# Patient Record
Sex: FEMALE | Race: WHITE | NOT HISPANIC OR LATINO | Employment: FULL TIME | URBAN - METROPOLITAN AREA
[De-identification: names, ages, dates, MRNs, and addresses within clinical notes are randomized per-mention and may not be internally consistent; named-entity substitution may affect disease eponyms.]

---

## 2022-12-14 PROBLEM — Z86.0100 HISTORY OF COLONIC POLYPS: Status: ACTIVE | Noted: 2022-12-14

## 2023-12-11 ENCOUNTER — TREATMENT (OUTPATIENT)
Dept: PHYSICAL THERAPY | Facility: CLINIC | Age: 53
End: 2023-12-11
Payer: COMMERCIAL

## 2023-12-11 DIAGNOSIS — S52.502D CLOSED FRACTURE OF DISTAL END OF LEFT RADIUS WITH ROUTINE HEALING, UNSPECIFIED FRACTURE MORPHOLOGY, SUBSEQUENT ENCOUNTER: Primary | ICD-10-CM

## 2023-12-11 DIAGNOSIS — S52.612A TRAUMATIC CLOSED FRACTURE OF ULNAR STYLOID WITH MINIMAL DISPLACEMENT, LEFT, INITIAL ENCOUNTER: ICD-10-CM

## 2023-12-11 DIAGNOSIS — M25.632 STIFFNESS OF LEFT WRIST JOINT: ICD-10-CM

## 2023-12-11 DIAGNOSIS — M25.532 LEFT WRIST PAIN: ICD-10-CM

## 2023-12-11 NOTE — PROGRESS NOTES
Occupational Therapy Daily Treatment Note  [address] [place] [address]      Patient: Danelle Rdz   : 1970  Referring practitioner: Shantanu Kelly*  Date of Initial Visit: Type: THERAPY  Noted: 2023  Today's Date: 2023  Patient seen for 8 sessions         Subjective   Danelle Rdz reports: my wrist did bother me last week when I pulled on a shopping cart that was stuck, but it is better now.    Objective   See Exercise, Manual, and Modality Logs for complete treatment.     Pt tolerated treatment well for strengthening and stretching.  Assessment/Plan    Visit Diagnoses:    ICD-10-CM ICD-9-CM   1. Closed fracture of distal end of left radius with routine healing, unspecified fracture morphology, subsequent encounter  S52.502D V54.12   2. Traumatic closed fracture of ulnar styloid with minimal displacement, left, initial encounter  S52.612A 813.43   3. Left wrist pain  M25.532 719.43   4. Stiffness of left wrist joint  M25.632 719.53       Continue per POC advancing as tolerated.         Timed:  Manual Therapy:    0     mins  76293;  Therapeutic Exercise:    10     mins  83476;     Therapeutic Activity:    10     mins  37849;  Ultrasound:     0     mins  84427;    Electrical Stimulation:    0     mins 45435;  Neuromuscular Jossue:    10    mins  18530;      Timed Treatment:   30   mins   Total Treatment:     30   min    MAXI Valerio/L  Occupational Therapist    Electronically signed   License number [id number]

## 2023-12-14 ENCOUNTER — OFFICE VISIT (OUTPATIENT)
Dept: ORTHOPEDIC SURGERY | Facility: CLINIC | Age: 53
End: 2023-12-14
Payer: COMMERCIAL

## 2023-12-14 ENCOUNTER — TREATMENT (OUTPATIENT)
Dept: PHYSICAL THERAPY | Facility: CLINIC | Age: 53
End: 2023-12-14
Payer: COMMERCIAL

## 2023-12-14 VITALS
OXYGEN SATURATION: 97 % | DIASTOLIC BLOOD PRESSURE: 81 MMHG | HEIGHT: 66 IN | SYSTOLIC BLOOD PRESSURE: 114 MMHG | BODY MASS INDEX: 25.49 KG/M2 | WEIGHT: 158.6 LBS | HEART RATE: 71 BPM

## 2023-12-14 DIAGNOSIS — M25.632 STIFFNESS OF LEFT WRIST JOINT: ICD-10-CM

## 2023-12-14 DIAGNOSIS — M25.532 LEFT WRIST PAIN: ICD-10-CM

## 2023-12-14 DIAGNOSIS — S52.612A TRAUMATIC CLOSED FRACTURE OF ULNAR STYLOID WITH MINIMAL DISPLACEMENT, LEFT, INITIAL ENCOUNTER: ICD-10-CM

## 2023-12-14 DIAGNOSIS — S52.502D CLOSED FRACTURE OF DISTAL END OF LEFT RADIUS WITH ROUTINE HEALING, UNSPECIFIED FRACTURE MORPHOLOGY, SUBSEQUENT ENCOUNTER: Primary | ICD-10-CM

## 2023-12-14 DIAGNOSIS — S52.615D CLOSED NONDISPLACED FRACTURE OF STYLOID PROCESS OF LEFT ULNA WITH ROUTINE HEALING, SUBSEQUENT ENCOUNTER: ICD-10-CM

## 2023-12-14 NOTE — PROGRESS NOTES
Occupational Therapy Daily Treatment Note  [address] [place] [id number]      Patient: Danelle Carpenter   : 1970  Referring practitioner: Shantanu Kelly*  Date of Initial Visit: Type: THERAPY  Noted: 2023  Today's Date: 2023  Patient seen for 9 sessions         Subjective   Danelle Carpenter reports: I saw the doctor yesterday and he released me so I think I am going to make today my last day. I did my work out today and no problem with weights. A little discomfort with some wall push ups.    Objective          Neurological Testing     Sensation     Wrist/Hand   Left   Intact: light touch    Active Range of Motion     Left Elbow   Forearm supination: 75 degrees   Forearm pronation: 75 degrees with pain    Left Wrist   Wrist flexion: 55 degrees   Wrist extension: 70 degrees   Radial deviation: 10 degrees   Ulnar deviation: 30 degrees     Right Wrist   Wrist flexion: 85 degrees   Wrist extension: 70 degrees     Additional Active Range of Motion Details  Full composite fist and thumb opposition    Strength/Myotome Testing     Left Wrist/Hand      (2nd hand position)     Trial 1: 29 lbs    Trial 2: 25 lbs    Trial 3: 27 lbs    Average: 27 lbs    Right Wrist/Hand      (2nd hand position)     Trial 1: 53 lbs    Trial 2: 50 lbs    Trial 3: 50 lbs    Average: 51 lbs    Swelling     Left Wrist/Hand   Circumference wrist: 16.2 cm    Right Wrist/Hand   Circumference wrist: 15.3 cm      See Exercise, Manual, and Modality Logs for complete treatment.       Assessment/Plan    Visit Diagnoses:    ICD-10-CM ICD-9-CM   1. Closed fracture of distal end of left radius with routine healing, unspecified fracture morphology, subsequent encounter  S52.502D V54.12   2. Traumatic closed fracture of ulnar styloid with minimal displacement, left, initial encounter  S52.612A 813.43   3. Left wrist pain  M25.532 719.43   4. Stiffness of left wrist joint  M25.632 719.53       Pt tolerated treatment  well and will continued strengthening and stretching at home. Discharge from Occupational Therapy.         Timed:  Manual Therapy:    0     mins  85709;  Therapeutic Exercise:    10     mins  66580;     Therapeutic Activity:    10     mins  04672;  Ultrasound:     00     mins  00109;    Electrical Stimulation:    0     mins 25812;  Neuromuscular Jossue:    10    mins  06447;      Timed Treatment:   30   mins   Total Treatment:     30   min    Annita Jackson OTR/L  Occupational Therapist    Electronically signed   License number [id number]

## 2023-12-14 NOTE — PROGRESS NOTES
"Chief Complaint  Follow-up of the Left Wrist    Subjective          History of Present Illness      Danelle Carpenter is a 53 y.o. female  presents to [facility] for     Patient presents for follow-up evaluation of left distal radius and ulnar styloid fractures, original injury occurred 9/26/2023.  At last visit her cast was removed and she was started on in the brace and with occupational therapy.  She states her wrist is \"a lot better \"with physical therapy she has regained most range of motion and strength.  She denies needing her wrist brace any longer send she denies needing pain medication or NSAIDs, she is happy with her progress, no new complaints.  She has a few more visits of therapy and she will then do home exercise program.      No Known Allergies     Social History     Socioeconomic History    Marital status:    Tobacco Use    Smoking status: Never     Passive exposure: Never    Smokeless tobacco: Never   Vaping Use    Vaping Use: Never used   Substance and Sexual Activity    Alcohol use: Never    Drug use: Never    Sexual activity: Yes     Birth control/protection: Surgical, Hysterectomy        REVIEW OF SYSTEMS    Constitutional: Awake alert and oriented x3, no acute distress, denies fevers, chills, weight loss  Respiratory: No respiratory distress  Vascular: Brisk cap refill, Intact distal pulses, No cyanosis, compartments soft with no signs or symptoms of compartment syndrome or DVT.   Cardiovascular: Denies chest pain, shortness of breath  Skin: Denies rashes, acute skin changes  Neurologic: Denies headache, loss of consciousness  MSK: Left wrist pain      Objective   Vital Signs:   /81   Pulse 71   Ht 167.6 cm (66\")   Wt 71.9 kg (158 lb 9.6 oz)   SpO2 97%   BMI 25.60 kg/m²     Body mass index is 25.6 kg/m².    Physical Exam       Left wrist: Nontender to palpation of dorsum of the wrist, volar wrist ulnar or radial side of the wrist, patient has " full extension full flexion with demonstration of prayer stretch and reverse prayer stretch, no pain with the stretches, she is able to make a full fist, sensation intact to light touch, neurovascular intact      Procedures    Imaging Results (Most Recent)       Procedure Component Value Units Date/Time    XR Wrist 2 View Left [[id number]] Resulted: 12/14/23 1036     Updated: 12/14/23 1036    Narrative:      View:AP/Lateral view(s)  Site: Left wrist  Indication: Wrist pain  Study: X-rays ordered, taken in the office, and reviewed today  X-ray: Good healing distal radius and ulna styloid fracture fracture   alignment remains stable compared to previous study  Comparative data: Previous studies             Result Review :   The following data was reviewed by: SHARON Villegas on 12/14/2023:  Data reviewed : Radiologic studies reviewed by me with the patient              Assessment and Plan    Diagnoses and all orders for this visit:    1. Closed fracture of distal end of left radius with routine healing, unspecified fracture morphology, subsequent encounter (Primary)    2. Closed nondisplaced fracture of styloid process of left ulna with routine healing, subsequent encounter    3. Left wrist pain  -     XR Wrist 2 View Left        Reviewed x-rays with the patient discussed diagnosis and treatment options with her she was advised to continue home exercises when finished with therapy, activity as tolerated follow-up as needed, patient agreed    Call or return if worsening symptoms.    Follow Up   Return if symptoms worsen or fail to improve.  Patient was given instructions and counseling regarding her condition or for health maintenance advice. Please see specific information pulled into the AVS if appropriate.       EMR Dragon/Transcription disclaimer:  Much of this encounter note is an electronic transcription/translation of spoken language to printed text, aka voice recognition.  The electronic translation  of spoken language may permit erroneous or at times nonsensical words or phrases to be inadvertently transcribed; although I have reviewed the note for such errors, some may still exist so please interpret based on surrounding text content.

## 2023-12-15 DIAGNOSIS — N95.2 VAGINAL ATROPHY: ICD-10-CM

## 2023-12-15 DIAGNOSIS — Z01.419 WELL WOMAN EXAM WITH ROUTINE GYNECOLOGICAL EXAM: ICD-10-CM

## 2023-12-15 RX ORDER — ESTRADIOL 0.1 MG/G
CREAM VAGINAL
Qty: 42.5 G | Refills: 2 | Status: CANCELLED | OUTPATIENT
Start: 2023-12-15

## 2023-12-15 NOTE — TELEPHONE ENCOUNTER
Caller: Danelle Carpenter    Relationship: Self    Best call back number: [phone]    Requested Prescriptions:   estradiol (ESTRACE VAGINAL) 0.1 MG/GM vaginal cream        Pharmacy where request should be sent:    [facility] [id number] - [place] - [address] AT [facility] [address] - [phone] [facility] [phone]  [phone]   Last office visit with prescribing clinician: 11/2/2023   Last telemedicine visit with prescribing clinician: Visit date not found   Next office visit with prescribing clinician: Visit date not found     Additional details provided by patient: PATIENT WOULD LIKE TO SWITCH BACK TO estradiol (ESTRACE VAGINAL) 0.1 MG/GM vaginal cream AND NOT DO THE HORMONE PATCHES.    Does the patient have less than a 3 day supply:  [x] Yes  [] No    Would you like a call back once the refill request has been completed: [x] Yes [] No    If the office needs to give you a call back, can they leave a voicemail: [x] Yes [] No    William Kaur Rep   12/15/23 11:16 EST

## 2023-12-19 RX ORDER — ESTRADIOL 0.1 MG/G
2 CREAM VAGINAL 2 TIMES WEEKLY
Qty: 42.5 G | Refills: 6 | Status: SHIPPED | OUTPATIENT
Start: 2023-12-21

## 2023-12-20 ENCOUNTER — LAB (OUTPATIENT)
Dept: LAB | Facility: HOSPITAL | Age: 53
End: 2023-12-20
Payer: COMMERCIAL

## 2023-12-20 DIAGNOSIS — E06.3 HYPOTHYROIDISM DUE TO HASHIMOTO'S THYROIDITIS: ICD-10-CM

## 2023-12-20 DIAGNOSIS — I34.1 MITRAL VALVE PROLAPSE: ICD-10-CM

## 2023-12-20 DIAGNOSIS — E03.8 HYPOTHYROIDISM DUE TO HASHIMOTO'S THYROIDITIS: ICD-10-CM

## 2023-12-20 DIAGNOSIS — E78.2 MIXED HYPERLIPIDEMIA: ICD-10-CM

## 2023-12-20 DIAGNOSIS — R53.83 FATIGUE, UNSPECIFIED TYPE: ICD-10-CM

## 2023-12-20 DIAGNOSIS — Z00.00 ANNUAL PHYSICAL EXAM: ICD-10-CM

## 2023-12-20 DIAGNOSIS — K21.9 GERD WITHOUT ESOPHAGITIS: ICD-10-CM

## 2023-12-20 LAB
ALBUMIN SERPL-MCNC: 4.9 G/DL (ref 3.5–5.2)
ALBUMIN/GLOB SERPL: 2 G/DL
ALP SERPL-CCNC: 84 U/L (ref 39–117)
ALT SERPL W P-5'-P-CCNC: 21 U/L (ref 1–33)
ANION GAP SERPL CALCULATED.3IONS-SCNC: 11.3 MMOL/L (ref 5–15)
AST SERPL-CCNC: 17 U/L (ref 1–32)
BASOPHILS # BLD AUTO: 0.07 10*3/MM3 (ref 0–0.2)
BASOPHILS NFR BLD AUTO: 1 % (ref 0–1.5)
BILIRUB SERPL-MCNC: 0.4 MG/DL (ref 0–1.2)
BUN SERPL-MCNC: 13 MG/DL (ref 6–20)
BUN/CREAT SERPL: 18.8 (ref 7–25)
CALCIUM SPEC-SCNC: 9.7 MG/DL (ref 8.6–10.5)
CHLORIDE SERPL-SCNC: 103 MMOL/L (ref 98–107)
CO2 SERPL-SCNC: 24.7 MMOL/L (ref 22–29)
CREAT SERPL-MCNC: 0.69 MG/DL (ref 0.57–1)
DEPRECATED RDW RBC AUTO: 41.6 FL (ref 37–54)
EGFRCR SERPLBLD CKD-EPI 2021: 102.6 ML/MIN/1.73
EOSINOPHIL # BLD AUTO: 0.19 10*3/MM3 (ref 0–0.4)
EOSINOPHIL NFR BLD AUTO: 2.7 % (ref 0.3–6.2)
ERYTHROCYTE [DISTWIDTH] IN BLOOD BY AUTOMATED COUNT: 12.6 % (ref 12.3–15.4)
GLOBULIN UR ELPH-MCNC: 2.5 GM/DL
GLUCOSE SERPL-MCNC: 93 MG/DL (ref 65–99)
HCT VFR BLD AUTO: 42.8 % (ref 34–46.6)
HGB BLD-MCNC: 14.6 G/DL (ref 12–15.9)
IMM GRANULOCYTES # BLD AUTO: 0.04 10*3/MM3 (ref 0–0.05)
IMM GRANULOCYTES NFR BLD AUTO: 0.6 % (ref 0–0.5)
LYMPHOCYTES # BLD AUTO: 2.83 10*3/MM3 (ref 0.7–3.1)
LYMPHOCYTES NFR BLD AUTO: 40.7 % (ref 19.6–45.3)
MCH RBC QN AUTO: 30.4 PG (ref 26.6–33)
MCHC RBC AUTO-ENTMCNC: 34.1 G/DL (ref 31.5–35.7)
MCV RBC AUTO: 89.2 FL (ref 79–97)
MONOCYTES # BLD AUTO: 0.48 10*3/MM3 (ref 0.1–0.9)
MONOCYTES NFR BLD AUTO: 6.9 % (ref 5–12)
NEUTROPHILS NFR BLD AUTO: 3.35 10*3/MM3 (ref 1.7–7)
NEUTROPHILS NFR BLD AUTO: 48.1 % (ref 42.7–76)
NRBC BLD AUTO-RTO: 0 /100 WBC (ref 0–0.2)
PLATELET # BLD AUTO: 280 10*3/MM3 (ref 140–450)
PMV BLD AUTO: 9.8 FL (ref 6–12)
POTASSIUM SERPL-SCNC: 4.4 MMOL/L (ref 3.5–5.2)
PROT SERPL-MCNC: 7.4 G/DL (ref 6–8.5)
RBC # BLD AUTO: 4.8 10*6/MM3 (ref 3.77–5.28)
SODIUM SERPL-SCNC: 139 MMOL/L (ref 136–145)
T4 FREE SERPL-MCNC: 1.48 NG/DL (ref 0.93–1.7)
TSH SERPL DL<=0.05 MIU/L-ACNC: 0.7 UIU/ML (ref 0.27–4.2)
WBC NRBC COR # BLD AUTO: 6.96 10*3/MM3 (ref 3.4–10.8)

## 2023-12-20 PROCEDURE — 80050 GENERAL HEALTH PANEL: CPT

## 2023-12-20 PROCEDURE — 36415 COLL VENOUS BLD VENIPUNCTURE: CPT

## 2023-12-20 PROCEDURE — 84439 ASSAY OF FREE THYROXINE: CPT

## 2023-12-28 ENCOUNTER — OFFICE VISIT (OUTPATIENT)
Dept: INTERNAL MEDICINE | Facility: CLINIC | Age: 53
End: 2023-12-28
Payer: COMMERCIAL

## 2023-12-28 VITALS
SYSTOLIC BLOOD PRESSURE: 117 MMHG | HEIGHT: 66 IN | TEMPERATURE: 98 F | WEIGHT: 158 LBS | HEART RATE: 75 BPM | DIASTOLIC BLOOD PRESSURE: 79 MMHG | BODY MASS INDEX: 25.39 KG/M2 | OXYGEN SATURATION: 95 %

## 2023-12-28 DIAGNOSIS — E78.2 MIXED HYPERLIPIDEMIA: ICD-10-CM

## 2023-12-28 DIAGNOSIS — E03.8 HYPOTHYROIDISM DUE TO HASHIMOTO'S THYROIDITIS: Primary | ICD-10-CM

## 2023-12-28 DIAGNOSIS — E06.3 HYPOTHYROIDISM DUE TO HASHIMOTO'S THYROIDITIS: Primary | ICD-10-CM

## 2023-12-28 DIAGNOSIS — K21.9 GERD WITHOUT ESOPHAGITIS: ICD-10-CM

## 2023-12-28 DIAGNOSIS — I34.1 MITRAL VALVE PROLAPSE: ICD-10-CM

## 2023-12-28 PROCEDURE — 99214 OFFICE O/P EST MOD 30 MIN: CPT | Performed by: INTERNAL MEDICINE

## 2023-12-28 RX ORDER — LEVOTHYROXINE SODIUM 112 UG/1
112 TABLET ORAL DAILY
Qty: 90 TABLET | Refills: 3 | Status: SHIPPED | OUTPATIENT
Start: 2023-12-28

## 2023-12-28 NOTE — PROGRESS NOTES
"CHIEF COMPLAINT/ HPI: Patient is here to follow-up with recent lab work, thyroid issues,  Hyperlipidemia (Routine follow up, Lab follow up up. Concern of weight gain. )    Patient fell broke her left wrist, was in a cast September 26, 2023, doing better overall now          Objective   Vital Signs  Vitals:    12/28/23 0823   BP: 117/79   Pulse: 75   Temp: 98 °F (36.7 °C)   SpO2: 95%   Weight: 71.7 kg (158 lb)   Height: 167.6 cm (65.98\")      Body mass index is 25.51 kg/m².  Review of Systems   Constitutional: Negative.    HENT: Negative.     Eyes: Negative.    Respiratory: Negative.     Cardiovascular: Negative.    Gastrointestinal: Negative.    Endocrine: Negative.    Genitourinary: Negative.    Musculoskeletal: Negative.    Allergic/Immunologic: Negative.    Neurological: Negative.    Hematological: Negative.    Psychiatric/Behavioral: Negative.        Physical Exam  Constitutional:       General: She is not in acute distress.     Appearance: Normal appearance.   HENT:      Head: Normocephalic.      Mouth/Throat:      Mouth: Mucous membranes are moist.   Eyes:      Conjunctiva/sclera: Conjunctivae normal.      Pupils: Pupils are equal, round, and reactive to light.   Cardiovascular:      Rate and Rhythm: Normal rate and regular rhythm.      Pulses: Normal pulses.      Heart sounds: Normal heart sounds.   Pulmonary:      Effort: Pulmonary effort is normal.      Breath sounds: Normal breath sounds.   Abdominal:      General: Abdomen is flat. Bowel sounds are normal.      Palpations: Abdomen is soft.   Musculoskeletal:         General: No swelling. Normal range of motion.      Cervical back: Neck supple.   Skin:     General: Skin is warm and dry.      Coloration: Skin is not jaundiced.   Neurological:      General: No focal deficit present.      Mental Status: She is alert and oriented to person, place, and time. Mental status is at baseline.   Psychiatric:         Mood and Affect: Mood normal.         Behavior: " "Behavior normal.         Thought Content: Thought content normal.         Judgment: Judgment normal.        Result Review :   No results found for: \"PROBNP\", \"BNP\"  CMP          6/20/2023    09:47 12/20/2023    09:37   CMP   Glucose 99  93    BUN 12  13    Creatinine 0.67  0.69    EGFR 103.4  102.6    Sodium 140  139    Potassium 4.9  4.4    Chloride 102  103    Calcium 10.0  9.7    Total Protein 7.7  7.4    Albumin 5.1  4.9    Globulin 2.6  2.5    Total Bilirubin 0.5  0.4    Alkaline Phosphatase 73  84    AST (SGOT) 17  17    ALT (SGPT) 16  21    Albumin/Globulin Ratio 2.0  2.0    BUN/Creatinine Ratio 17.9  18.8    Anion Gap 10.0  11.3      CBC w/diff          6/20/2023    09:47 12/20/2023    09:37   CBC w/Diff   WBC 6.62  6.96    RBC 5.05  4.80    Hemoglobin 14.9  14.6    Hematocrit 44.3  42.8    MCV 87.7  89.2    MCH 29.5  30.4    MCHC 33.6  34.1    RDW 12.4  12.6    Platelets 274  280    Neutrophil Rel % 49.7  48.1    Immature Granulocyte Rel % 0.3  0.6    Lymphocyte Rel % 38.8  40.7    Monocyte Rel % 8.0  6.9    Eosinophil Rel % 2.6  2.7    Basophil Rel % 0.6  1.0       Lipid Panel          6/20/2023    09:47   Lipid Panel   Total Cholesterol 192    Triglycerides 189    HDL Cholesterol 65    VLDL Cholesterol 32    LDL Cholesterol  95    LDL/HDL Ratio 1.37       Lab Results   Component Value Date    TSH 0.703 12/20/2023    TSH 0.032 (L) 06/20/2023    TSH 0.060 (L) 06/15/2022      Lab Results   Component Value Date    FREET4 1.48 12/20/2023    FREET4 1.82 (H) 06/20/2023    FREET4 1.58 06/15/2022                          Visit Diagnoses:    ICD-10-CM ICD-9-CM   1. Hypothyroidism due to Hashimoto's thyroiditis  E03.8 244.8    E06.3 245.2   2. GERD without esophagitis  K21.9 530.81   3. Mixed hyperlipidemia  E78.2 272.2   4. Mitral valve prolapse  I34.1 424.0       Assessment and Plan   Diagnoses and all orders for this visit:    1. Hypothyroidism due to Hashimoto's thyroiditis (Primary)  -     CBC & Differential; " Future  -     Comprehensive Metabolic Panel; Future  -     Lipid Panel; Future  -     TSH+Free T4; Future    2. GERD without esophagitis  -     CBC & Differential; Future  -     Comprehensive Metabolic Panel; Future  -     Lipid Panel; Future  -     TSH+Free T4; Future    3. Mixed hyperlipidemia  -     CBC & Differential; Future  -     Comprehensive Metabolic Panel; Future  -     Lipid Panel; Future  -     TSH+Free T4; Future    4. Mitral valve prolapse  -     CBC & Differential; Future  -     Comprehensive Metabolic Panel; Future  -     Lipid Panel; Future  -     TSH+Free T4; Future    Other orders  -     levothyroxine (Synthroid) 112 MCG tablet; Take 1 tablet by mouth Daily.  Dispense: 90 tablet; Refill: 3      Wrist fracture September 2023 after a fall, doing well,  Bone density test showed normal results June 5, 2023     Colonoscopy for colon polyps February 2018 ---3 polyps mild gastritis Dr. Ring -------March 2023,, found several polyps, did upper and lower, has a follow-up again in 3 years, Dr. Vargas    History of odontophagia--- fullness esophagus, previous esophagram showed mid esophageal fullness, CT of the chest July 2019 showed no significant thoracic esophageal abnormality,  no lung abnormality,    Hypothyroidism continues  levothyroxine 0.112 mg daily    Mixed hyperlipidemia continues  Crestor 5 mg daily     Mitral valve prolapse, palpitations, continues  propranolol 10 mg every other day --- echo 2018 showed mild mitral valve prolapse,, repeat echo June 5, 2023 --shows normal LV function, diastolic dysfunction was normal no significant valve abnormalities    Thyroid nodules previous--- ultrasound July 2015 no nodules at that time    Anxiety continues fluoxetine 10 mg every 3 days,      Follow Up   Return in about 6 months (around 6/28/2024).  Patient was given instructions and counseling regarding her condition or for health maintenance advice. Please see specific information pulled into the  AVS if appropriate.         Answers submitted by the patient for this visit:  Primary Reason for Visit (Submitted on 12/21/2023)  What is the primary reason for your visit?: Physical

## 2024-02-13 ENCOUNTER — HOSPITAL ENCOUNTER (OUTPATIENT)
Dept: MAMMOGRAPHY | Facility: HOSPITAL | Age: 54
Discharge: HOME | End: 2024-02-13
Admitting: OBSTETRICS & GYNECOLOGY
Payer: COMMERCIAL

## 2024-02-13 DIAGNOSIS — Z01.419 WELL WOMAN EXAM WITH ROUTINE GYNECOLOGICAL EXAM: ICD-10-CM

## 2024-02-13 PROCEDURE — 77063 BREAST TOMOSYNTHESIS BI: CPT

## 2024-02-13 PROCEDURE — 77067 SCR MAMMO BI INCL CAD: CPT

## 2024-04-04 ENCOUNTER — OFFICE VISIT (OUTPATIENT)
Dept: INTERNAL MEDICINE | Age: 54
End: 2024-04-04
Payer: COMMERCIAL

## 2024-04-04 VITALS
WEIGHT: 159 LBS | HEIGHT: 66 IN | TEMPERATURE: 98.2 F | OXYGEN SATURATION: 98 % | DIASTOLIC BLOOD PRESSURE: 80 MMHG | SYSTOLIC BLOOD PRESSURE: 114 MMHG | BODY MASS INDEX: 25.55 KG/M2

## 2024-04-04 DIAGNOSIS — R10.31 RIGHT LOWER QUADRANT ABDOMINAL PAIN: Primary | ICD-10-CM

## 2024-04-04 PROCEDURE — 99213 OFFICE O/P EST LOW 20 MIN: CPT | Performed by: INTERNAL MEDICINE

## 2024-04-04 RX ORDER — UBIDECARENONE 100 MG
100 CAPSULE ORAL DAILY
COMMUNITY

## 2024-04-04 NOTE — PROGRESS NOTES
"CHIEF COMPLAINT/ HPI:  Abdominal Pain (Rt side abdominal pain, tender to the touch, comes and goes. Pt states normal BM's. No nausea and vomiting. )    No other bowel symptoms, no fevers, no diarrhea,          Objective   Vital Signs  Vitals:    04/04/24 1515   BP: 114/80   Temp: 98.2 °F (36.8 °C)   SpO2: 98%   Weight: 72.1 kg (159 lb)   Height: 167.6 cm (65.98\")      Body mass index is 25.68 kg/m².  Review of Systems abdominal pain focal area right lower quadrant mid quadrant, came and went a couple times over the past 72 hours,  Physical Exam  Abdominal:      Palpations: Abdomen is soft.      Tenderness: There is abdominal tenderness (Questionable mild tenderness right mid quadrant area no rash, no guarding no rigidity, no masses felt,).        Result Review :   No results found for: \"PROBNP\", \"BNP\"  CMP          6/20/2023    09:47 12/20/2023    09:37   CMP   Glucose 99  93    BUN 12  13    Creatinine 0.67  0.69    EGFR 103.4  102.6    Sodium 140  139    Potassium 4.9  4.4    Chloride 102  103    Calcium 10.0  9.7    Total Protein 7.7  7.4    Albumin 5.1  4.9    Globulin 2.6  2.5    Total Bilirubin 0.5  0.4    Alkaline Phosphatase 73  84    AST (SGOT) 17  17    ALT (SGPT) 16  21    Albumin/Globulin Ratio 2.0  2.0    BUN/Creatinine Ratio 17.9  18.8    Anion Gap 10.0  11.3      CBC w/diff          6/20/2023    09:47 12/20/2023    09:37   CBC w/Diff   WBC 6.62  6.96    RBC 5.05  4.80    Hemoglobin 14.9  14.6    Hematocrit 44.3  42.8    MCV 87.7  89.2    MCH 29.5  30.4    MCHC 33.6  34.1    RDW 12.4  12.6    Platelets 274  280    Neutrophil Rel % 49.7  48.1    Immature Granulocyte Rel % 0.3  0.6    Lymphocyte Rel % 38.8  40.7    Monocyte Rel % 8.0  6.9    Eosinophil Rel % 2.6  2.7    Basophil Rel % 0.6  1.0       Lipid Panel          6/20/2023    09:47   Lipid Panel   Total Cholesterol 192    Triglycerides 189    HDL Cholesterol 65    VLDL Cholesterol 32    LDL Cholesterol  95    LDL/HDL Ratio 1.37       Lab Results "   Component Value Date    TSH 0.703 12/20/2023    TSH 0.032 (L) 06/20/2023    TSH 0.060 (L) 06/15/2022      Lab Results   Component Value Date    FREET4 1.48 12/20/2023    FREET4 1.82 (H) 06/20/2023    FREET4 1.58 06/15/2022                          Visit Diagnoses:    ICD-10-CM ICD-9-CM   1. Right lower quadrant abdominal pain  R10.31 789.03       Assessment and Plan   Diagnoses and all orders for this visit:    1. Right lower quadrant abdominal pain (Primary)  -     CBC & Differential; Future  -     Comprehensive Metabolic Panel; Future  -     CT Abdomen Pelvis With Contrast; Future      Right mid quadrant abdominal pain etiology is unclear, discussed differential workup etc., April 4, 2024,      Wrist fracture September 2023 after a fall, doing well,  Bone density test showed normal results June 5, 2023     Colonoscopy for colon polyps February 2018 ---3 polyps mild gastritis Dr. Ring -------repeat March 2023,, found several polyps, did upper and lower, has a follow-up again in 3 years, Dr. Vargas    History of odontophagia--- fullness esophagus, previous esophagram showed mid esophageal fullness, CT of the chest July 2019 showed no significant thoracic esophageal abnormality,  no lung abnormality,    Hypothyroidism continues  levothyroxine 0.112 mg daily    Mixed hyperlipidemia continues  Crestor 5 mg daily     Mitral valve prolapse, palpitations, continues  propranolol 10 mg every other day --- echo 2018 showed mild mitral valve prolapse,, repeat echo June 5, 2023 --shows normal LV function, diastolic dysfunction was normal no significant valve abnormalities    Thyroid nodules previous--- ultrasound July 2015 no nodules at that time    Anxiety continues fluoxetine 10 mg every 3 days,     BRYANT October 2020,      Follow Up   No follow-ups on file.  Patient was given instructions and counseling regarding her condition or for health maintenance advice. Please see specific information pulled into the AVS if  appropriate.

## 2024-04-10 ENCOUNTER — LAB (OUTPATIENT)
Dept: LAB | Facility: HOSPITAL | Age: 54
End: 2024-04-10
Payer: COMMERCIAL

## 2024-04-10 DIAGNOSIS — R10.31 RIGHT LOWER QUADRANT ABDOMINAL PAIN: ICD-10-CM

## 2024-04-10 LAB
ALBUMIN SERPL-MCNC: 4.7 G/DL (ref 3.5–5.2)
ALBUMIN/GLOB SERPL: 1.7 G/DL
ALP SERPL-CCNC: 73 U/L (ref 39–117)
ALT SERPL W P-5'-P-CCNC: 19 U/L (ref 1–33)
ANION GAP SERPL CALCULATED.3IONS-SCNC: 12.3 MMOL/L (ref 5–15)
AST SERPL-CCNC: 18 U/L (ref 1–32)
BASOPHILS # BLD AUTO: 0.03 10*3/MM3 (ref 0–0.2)
BASOPHILS NFR BLD AUTO: 0.5 % (ref 0–1.5)
BILIRUB SERPL-MCNC: 0.5 MG/DL (ref 0–1.2)
BUN SERPL-MCNC: 11 MG/DL (ref 6–20)
BUN/CREAT SERPL: 16.2 (ref 7–25)
CALCIUM SPEC-SCNC: 9.3 MG/DL (ref 8.6–10.5)
CHLORIDE SERPL-SCNC: 104 MMOL/L (ref 98–107)
CO2 SERPL-SCNC: 25.7 MMOL/L (ref 22–29)
CREAT SERPL-MCNC: 0.68 MG/DL (ref 0.57–1)
DEPRECATED RDW RBC AUTO: 40.1 FL (ref 37–54)
EGFRCR SERPLBLD CKD-EPI 2021: 102.4 ML/MIN/1.73
EOSINOPHIL # BLD AUTO: 0.17 10*3/MM3 (ref 0–0.4)
EOSINOPHIL NFR BLD AUTO: 2.6 % (ref 0.3–6.2)
ERYTHROCYTE [DISTWIDTH] IN BLOOD BY AUTOMATED COUNT: 12.4 % (ref 12.3–15.4)
GLOBULIN UR ELPH-MCNC: 2.8 GM/DL
GLUCOSE SERPL-MCNC: 96 MG/DL (ref 65–99)
HCT VFR BLD AUTO: 43.4 % (ref 34–46.6)
HGB BLD-MCNC: 13.9 G/DL (ref 12–15.9)
IMM GRANULOCYTES # BLD AUTO: 0.02 10*3/MM3 (ref 0–0.05)
IMM GRANULOCYTES NFR BLD AUTO: 0.3 % (ref 0–0.5)
LYMPHOCYTES # BLD AUTO: 2.7 10*3/MM3 (ref 0.7–3.1)
LYMPHOCYTES NFR BLD AUTO: 41 % (ref 19.6–45.3)
MCH RBC QN AUTO: 28 PG (ref 26.6–33)
MCHC RBC AUTO-ENTMCNC: 32 G/DL (ref 31.5–35.7)
MCV RBC AUTO: 87.5 FL (ref 79–97)
MONOCYTES # BLD AUTO: 0.49 10*3/MM3 (ref 0.1–0.9)
MONOCYTES NFR BLD AUTO: 7.4 % (ref 5–12)
NEUTROPHILS NFR BLD AUTO: 3.18 10*3/MM3 (ref 1.7–7)
NEUTROPHILS NFR BLD AUTO: 48.2 % (ref 42.7–76)
NRBC BLD AUTO-RTO: 0 /100 WBC (ref 0–0.2)
PLATELET # BLD AUTO: 280 10*3/MM3 (ref 140–450)
PMV BLD AUTO: 9.6 FL (ref 6–12)
POTASSIUM SERPL-SCNC: 4.9 MMOL/L (ref 3.5–5.2)
PROT SERPL-MCNC: 7.5 G/DL (ref 6–8.5)
RBC # BLD AUTO: 4.96 10*6/MM3 (ref 3.77–5.28)
SODIUM SERPL-SCNC: 142 MMOL/L (ref 136–145)
WBC NRBC COR # BLD AUTO: 6.59 10*3/MM3 (ref 3.4–10.8)

## 2024-04-10 PROCEDURE — 36415 COLL VENOUS BLD VENIPUNCTURE: CPT

## 2024-04-10 PROCEDURE — 80053 COMPREHEN METABOLIC PANEL: CPT

## 2024-04-10 PROCEDURE — 85025 COMPLETE CBC W/AUTO DIFF WBC: CPT

## 2024-04-11 ENCOUNTER — TELEPHONE (OUTPATIENT)
Dept: INTERNAL MEDICINE | Age: 54
End: 2024-04-11
Payer: COMMERCIAL

## 2024-04-11 NOTE — TELEPHONE ENCOUNTER
Call patient tell her her chemistry panel was perfect all labs were normal on the comprehensive profile, and her blood counts were normal

## 2024-04-18 ENCOUNTER — TELEPHONE (OUTPATIENT)
Dept: INTERNAL MEDICINE | Age: 54
End: 2024-04-18
Payer: COMMERCIAL

## 2024-04-18 ENCOUNTER — HOSPITAL ENCOUNTER (OUTPATIENT)
Dept: CT IMAGING | Facility: HOSPITAL | Age: 54
Discharge: HOME | End: 2024-04-18
Admitting: INTERNAL MEDICINE
Payer: COMMERCIAL

## 2024-04-18 DIAGNOSIS — R10.31 RIGHT LOWER QUADRANT ABDOMINAL PAIN: ICD-10-CM

## 2024-04-18 PROCEDURE — 74177 CT ABD & PELVIS W/CONTRAST: CPT

## 2024-04-18 PROCEDURE — 25510000001 IOPAMIDOL PER 1 ML: Performed by: INTERNAL MEDICINE

## 2024-04-18 RX ADMIN — IOPAMIDOL 100 ML: 755 INJECTION, SOLUTION INTRAVENOUS at 09:46

## 2024-04-18 NOTE — TELEPHONE ENCOUNTER
Call patient tell her that it looks like she may have passed a stone on the right side there was a small stone just at the inferior portion of the ureter going into the bladder and there was a little bit of distention in that right ureter or collecting system suggesting the possibility of a recently passed stone no evidence of any blockages at this time, no other acute process was noted on the CAT scan, -----------make sure patient is doing better, if she is not doing better or still having a lot of symptoms pain etc. she will need an appointment back with me sometime soon

## 2024-04-19 NOTE — TELEPHONE ENCOUNTER
Spoke with pt, gave her the message, she is asking what the Lung Fat means in the scan portion. Please advise.

## 2024-04-24 RX ORDER — LEVOTHYROXINE SODIUM 112 MCG
112 TABLET ORAL DAILY
Qty: 90 TABLET | Refills: 0 | Status: SHIPPED | OUTPATIENT
Start: 2024-04-24

## 2024-05-02 RX ORDER — PROPRANOLOL HYDROCHLORIDE 10 MG/1
10 TABLET ORAL EVERY OTHER DAY
Qty: 90 TABLET | Refills: 1 | Status: SHIPPED | OUTPATIENT
Start: 2024-05-02

## 2024-05-02 NOTE — TELEPHONE ENCOUNTER
Rx Refill Note  Requested Prescriptions     Pending Prescriptions Disp Refills    propranolol (INDERAL) 10 MG tablet 90 tablet 1     Sig: Take 1 tablet by mouth Every Other Day.      Last office visit with prescribing clinician: 4/4/2024   Last telemedicine visit with prescribing clinician: Visit date not found   Next office visit with prescribing clinician: 7/8/2024                         Would you like a call back once the refill request has been completed: [] Yes [] No    If the office needs to give you a call back, can they leave a voicemail: [] Yes [] No    Frida Rodríguez MA  05/02/24, 13:45 EDT

## 2024-05-06 ENCOUNTER — TELEPHONE (OUTPATIENT)
Dept: OBSTETRICS AND GYNECOLOGY | Facility: CLINIC | Age: 54
End: 2024-05-06
Payer: COMMERCIAL

## 2024-05-06 RX ORDER — ESTRADIOL 0.1 MG/G
2 CREAM VAGINAL 2 TIMES WEEKLY
Qty: 42.5 G | Refills: 6 | Status: SHIPPED | OUTPATIENT
Start: 2024-05-06

## 2024-06-27 ENCOUNTER — LAB (OUTPATIENT)
Dept: LAB | Facility: HOSPITAL | Age: 54
End: 2024-06-27
Payer: COMMERCIAL

## 2024-06-27 DIAGNOSIS — K21.9 GERD WITHOUT ESOPHAGITIS: ICD-10-CM

## 2024-06-27 DIAGNOSIS — E78.2 MIXED HYPERLIPIDEMIA: ICD-10-CM

## 2024-06-27 DIAGNOSIS — E03.8 HYPOTHYROIDISM DUE TO HASHIMOTO'S THYROIDITIS: ICD-10-CM

## 2024-06-27 DIAGNOSIS — E06.3 HYPOTHYROIDISM DUE TO HASHIMOTO'S THYROIDITIS: ICD-10-CM

## 2024-06-27 DIAGNOSIS — I34.1 MITRAL VALVE PROLAPSE: ICD-10-CM

## 2024-06-27 LAB
ALBUMIN SERPL-MCNC: 4.7 G/DL (ref 3.5–5.2)
ALBUMIN/GLOB SERPL: 1.6 G/DL
ALP SERPL-CCNC: 71 U/L (ref 39–117)
ALT SERPL W P-5'-P-CCNC: 18 U/L (ref 1–33)
ANION GAP SERPL CALCULATED.3IONS-SCNC: 11.3 MMOL/L (ref 5–15)
AST SERPL-CCNC: 18 U/L (ref 1–32)
BASOPHILS # BLD AUTO: 0.04 10*3/MM3 (ref 0–0.2)
BASOPHILS NFR BLD AUTO: 0.6 % (ref 0–1.5)
BILIRUB SERPL-MCNC: 0.5 MG/DL (ref 0–1.2)
BUN SERPL-MCNC: 13 MG/DL (ref 6–20)
BUN/CREAT SERPL: 18.8 (ref 7–25)
CALCIUM SPEC-SCNC: 10.1 MG/DL (ref 8.6–10.5)
CHLORIDE SERPL-SCNC: 103 MMOL/L (ref 98–107)
CHOLEST SERPL-MCNC: 182 MG/DL (ref 0–200)
CO2 SERPL-SCNC: 25.7 MMOL/L (ref 22–29)
CREAT SERPL-MCNC: 0.69 MG/DL (ref 0.57–1)
DEPRECATED RDW RBC AUTO: 40.6 FL (ref 37–54)
EGFRCR SERPLBLD CKD-EPI 2021: 102 ML/MIN/1.73
EOSINOPHIL # BLD AUTO: 0.2 10*3/MM3 (ref 0–0.4)
EOSINOPHIL NFR BLD AUTO: 3 % (ref 0.3–6.2)
ERYTHROCYTE [DISTWIDTH] IN BLOOD BY AUTOMATED COUNT: 12.6 % (ref 12.3–15.4)
GLOBULIN UR ELPH-MCNC: 2.9 GM/DL
GLUCOSE SERPL-MCNC: 92 MG/DL (ref 65–99)
HCT VFR BLD AUTO: 42.6 % (ref 34–46.6)
HDLC SERPL-MCNC: 60 MG/DL (ref 40–60)
HGB BLD-MCNC: 14.3 G/DL (ref 12–15.9)
IMM GRANULOCYTES # BLD AUTO: 0.02 10*3/MM3 (ref 0–0.05)
IMM GRANULOCYTES NFR BLD AUTO: 0.3 % (ref 0–0.5)
LDLC SERPL CALC-MCNC: 87 MG/DL (ref 0–100)
LDLC/HDLC SERPL: 1.34 {RATIO}
LYMPHOCYTES # BLD AUTO: 2.96 10*3/MM3 (ref 0.7–3.1)
LYMPHOCYTES NFR BLD AUTO: 45.1 % (ref 19.6–45.3)
MCH RBC QN AUTO: 29.7 PG (ref 26.6–33)
MCHC RBC AUTO-ENTMCNC: 33.6 G/DL (ref 31.5–35.7)
MCV RBC AUTO: 88.4 FL (ref 79–97)
MONOCYTES # BLD AUTO: 0.44 10*3/MM3 (ref 0.1–0.9)
MONOCYTES NFR BLD AUTO: 6.7 % (ref 5–12)
NEUTROPHILS NFR BLD AUTO: 2.91 10*3/MM3 (ref 1.7–7)
NEUTROPHILS NFR BLD AUTO: 44.3 % (ref 42.7–76)
NRBC BLD AUTO-RTO: 0 /100 WBC (ref 0–0.2)
PLATELET # BLD AUTO: 263 10*3/MM3 (ref 140–450)
PMV BLD AUTO: 9.6 FL (ref 6–12)
POTASSIUM SERPL-SCNC: 4.6 MMOL/L (ref 3.5–5.2)
PROT SERPL-MCNC: 7.6 G/DL (ref 6–8.5)
RBC # BLD AUTO: 4.82 10*6/MM3 (ref 3.77–5.28)
SODIUM SERPL-SCNC: 140 MMOL/L (ref 136–145)
T4 FREE SERPL-MCNC: 1.55 NG/DL (ref 0.92–1.68)
TRIGL SERPL-MCNC: 208 MG/DL (ref 0–150)
TSH SERPL DL<=0.05 MIU/L-ACNC: 0.59 UIU/ML (ref 0.27–4.2)
VLDLC SERPL-MCNC: 35 MG/DL (ref 5–40)
WBC NRBC COR # BLD AUTO: 6.57 10*3/MM3 (ref 3.4–10.8)

## 2024-06-27 PROCEDURE — 36415 COLL VENOUS BLD VENIPUNCTURE: CPT

## 2024-06-27 PROCEDURE — 84439 ASSAY OF FREE THYROXINE: CPT

## 2024-06-27 PROCEDURE — 80061 LIPID PANEL: CPT

## 2024-06-27 PROCEDURE — 80050 GENERAL HEALTH PANEL: CPT

## 2024-07-08 ENCOUNTER — OFFICE VISIT (OUTPATIENT)
Dept: INTERNAL MEDICINE | Age: 54
End: 2024-07-08
Payer: COMMERCIAL

## 2024-07-08 VITALS
BODY MASS INDEX: 25.23 KG/M2 | WEIGHT: 157 LBS | SYSTOLIC BLOOD PRESSURE: 114 MMHG | TEMPERATURE: 98.2 F | OXYGEN SATURATION: 96 % | DIASTOLIC BLOOD PRESSURE: 80 MMHG | HEART RATE: 75 BPM | HEIGHT: 66 IN

## 2024-07-08 DIAGNOSIS — I34.1 MITRAL VALVE PROLAPSE: ICD-10-CM

## 2024-07-08 DIAGNOSIS — E06.3 HYPOTHYROIDISM DUE TO HASHIMOTO'S THYROIDITIS: ICD-10-CM

## 2024-07-08 DIAGNOSIS — E03.8 HYPOTHYROIDISM DUE TO HASHIMOTO'S THYROIDITIS: ICD-10-CM

## 2024-07-08 DIAGNOSIS — Z00.00 ANNUAL PHYSICAL EXAM: Primary | ICD-10-CM

## 2024-07-08 DIAGNOSIS — R53.83 FATIGUE, UNSPECIFIED TYPE: ICD-10-CM

## 2024-07-08 DIAGNOSIS — K21.9 GERD WITHOUT ESOPHAGITIS: ICD-10-CM

## 2024-07-08 DIAGNOSIS — E78.2 MIXED HYPERLIPIDEMIA: ICD-10-CM

## 2024-07-08 PROCEDURE — 99396 PREV VISIT EST AGE 40-64: CPT | Performed by: INTERNAL MEDICINE

## 2024-07-08 RX ORDER — ESTRADIOL 0.05 MG/D
1 PATCH, EXTENDED RELEASE TRANSDERMAL 2 TIMES WEEKLY
Qty: 24 PATCH | Refills: 3 | Status: SHIPPED | OUTPATIENT
Start: 2024-07-08

## 2024-07-08 NOTE — PROGRESS NOTES
"CHIEF COMPLAINT/ HPI:  Annual Exam (Physical , Lab follow up. Pt is asking if her hormone cream etc will interfere with her thyroid med. )              Objective   Vital Signs  Vitals:    07/08/24 1143   BP: 114/80   Pulse: 75   Temp: 98.2 °F (36.8 °C)   SpO2: 96%   Weight: 71.2 kg (157 lb)   Height: 167.6 cm (65.98\")      Body mass index is 25.35 kg/m².  Review of Systems   Constitutional: Negative.    HENT: Negative.     Eyes: Negative.    Respiratory: Negative.     Cardiovascular: Negative.    Gastrointestinal: Negative.    Endocrine: Negative.    Genitourinary: Negative.    Musculoskeletal: Negative.    Allergic/Immunologic: Negative.    Neurological: Negative.    Hematological: Negative.    Psychiatric/Behavioral: Negative.        Physical Exam  Constitutional:       General: She is not in acute distress.     Appearance: Normal appearance.   HENT:      Head: Normocephalic.      Mouth/Throat:      Mouth: Mucous membranes are moist.   Eyes:      Conjunctiva/sclera: Conjunctivae normal.      Pupils: Pupils are equal, round, and reactive to light.   Cardiovascular:      Rate and Rhythm: Normal rate and regular rhythm.      Pulses: Normal pulses.      Heart sounds: Normal heart sounds.   Pulmonary:      Effort: Pulmonary effort is normal.      Breath sounds: Normal breath sounds.   Abdominal:      General: Abdomen is flat. Bowel sounds are normal.      Palpations: Abdomen is soft.   Musculoskeletal:         General: No swelling. Normal range of motion.      Cervical back: Neck supple.   Skin:     General: Skin is warm and dry.      Coloration: Skin is not jaundiced.   Neurological:      General: No focal deficit present.      Mental Status: She is alert and oriented to person, place, and time. Mental status is at baseline.   Psychiatric:         Mood and Affect: Mood normal.         Behavior: Behavior normal.         Thought Content: Thought content normal.         Judgment: Judgment normal.        Result Review : " "  No results found for: \"PROBNP\", \"BNP\"  CMP          12/20/2023    09:37 4/10/2024    09:18 6/27/2024    09:44   CMP   Glucose 93  96  92    BUN 13  11  13    Creatinine 0.69  0.68  0.69    EGFR 102.6  102.4  102.0    Sodium 139  142  140    Potassium 4.4  4.9  4.6    Chloride 103  104  103    Calcium 9.7  9.3  10.1    Total Protein 7.4  7.5  7.6    Albumin 4.9  4.7  4.7    Globulin 2.5  2.8  2.9    Total Bilirubin 0.4  0.5  0.5    Alkaline Phosphatase 84  73  71    AST (SGOT) 17  18  18    ALT (SGPT) 21  19  18    Albumin/Globulin Ratio 2.0  1.7  1.6    BUN/Creatinine Ratio 18.8  16.2  18.8    Anion Gap 11.3  12.3  11.3      CBC w/diff          12/20/2023    09:37 4/10/2024    09:18 6/27/2024    09:44   CBC w/Diff   WBC 6.96  6.59  6.57    RBC 4.80  4.96  4.82    Hemoglobin 14.6  13.9  14.3    Hematocrit 42.8  43.4  42.6    MCV 89.2  87.5  88.4    MCH 30.4  28.0  29.7    MCHC 34.1  32.0  33.6    RDW 12.6  12.4  12.6    Platelets 280  280  263    Neutrophil Rel % 48.1  48.2  44.3    Immature Granulocyte Rel % 0.6  0.3  0.3    Lymphocyte Rel % 40.7  41.0  45.1    Monocyte Rel % 6.9  7.4  6.7    Eosinophil Rel % 2.7  2.6  3.0    Basophil Rel % 1.0  0.5  0.6       Lipid Panel          6/27/2024    09:44   Lipid Panel   Total Cholesterol 182    Triglycerides 208    HDL Cholesterol 60    VLDL Cholesterol 35    LDL Cholesterol  87    LDL/HDL Ratio 1.34       Lab Results   Component Value Date    TSH 0.590 06/27/2024    TSH 0.703 12/20/2023    TSH 0.032 (L) 06/20/2023      Lab Results   Component Value Date    FREET4 1.55 06/27/2024    FREET4 1.48 12/20/2023    FREET4 1.82 (H) 06/20/2023                          Visit Diagnoses:    ICD-10-CM ICD-9-CM   1. Annual physical exam  Z00.00 V70.0   2. Fatigue, unspecified type  R53.83 780.79   3. Hypothyroidism due to Hashimoto's thyroiditis  E03.8 244.8    E06.3 245.2   4. Mixed hyperlipidemia  E78.2 272.2   5. GERD without esophagitis  K21.9 530.81   6. Mitral valve prolapse  " I34.1 424.0       Assessment and Plan   Diagnoses and all orders for this visit:    1. Annual physical exam (Primary)  -     CBC & Differential; Future  -     Comprehensive Metabolic Panel; Future  -     Lipid Panel; Future  -     TSH+Free T4; Future    2. Fatigue, unspecified type  -     CBC & Differential; Future  -     Comprehensive Metabolic Panel; Future  -     Lipid Panel; Future  -     TSH+Free T4; Future    3. Hypothyroidism due to Hashimoto's thyroiditis  -     CBC & Differential; Future  -     Comprehensive Metabolic Panel; Future  -     Lipid Panel; Future  -     TSH+Free T4; Future    4. Mixed hyperlipidemia  -     CBC & Differential; Future  -     Comprehensive Metabolic Panel; Future  -     Lipid Panel; Future  -     TSH+Free T4; Future    5. GERD without esophagitis  -     CBC & Differential; Future  -     Comprehensive Metabolic Panel; Future  -     Lipid Panel; Future  -     TSH+Free T4; Future    6. Mitral valve prolapse  -     CBC & Differential; Future  -     Comprehensive Metabolic Panel; Future  -     Lipid Panel; Future  -     TSH+Free T4; Future    Other orders  -     estradiol (MINIVELLE, VIVELLE-DOT) 0.05 MG/24HR patch; Place 1 patch on the skin as directed by provider 2 (Two) Times a Week.  Dispense: 24 patch; Refill: 3      Right mid quadrant abdominal pain etiology is unclear, discussed differential workup etc., April 4, 2024,, resolved, CT abdomen and pelvis April 18, 2024 shows no acute intra-abdominal or pelvic abnormalities, stone in the bladder just past the right UVJ possibly recently passed stone,      Wrist fracture September 2023 after a fall, doing well,  Bone density test showed normal results June 5, 2023     Colonoscopy for colon polyps February 2018 ---3 polyps mild gastritis Dr. Ring -------repeat March 2023,,, Dr. Vargas, found several polyps, did upper and lower, has a follow-up again in 3 years, Dr. Vargas    History of odontophagia--- fullness esophagus,  previous esophagram showed mid esophageal fullness, CT of the chest July 2019 showed no significant thoracic esophageal abnormality,  no lung abnormality,    Hypothyroidism continues  levothyroxine 0.112 mg daily    Mixed hyperlipidemia continues  Crestor 5 mg daily     Mitral valve prolapse, palpitations, continues  propranolol 10 mg every other day --- echo 2018 showed mild mitral valve prolapse,, repeat echo June 5, 2023 --shows normal LV function, diastolic dysfunction was normal no significant valve abnormalities    Thyroid nodules previous--- ultrasound July 2015 no nodules at that time    Anxiety continues fluoxetine 10 mg every 3 days,     BRYANT October 2020,    Follow Up   Return in about 1 year (around 7/8/2025).  Patient was given instructions and counseling regarding her condition or for health maintenance advice. Please see specific information pulled into the AVS if appropriate.

## 2024-07-22 RX ORDER — LEVOTHYROXINE SODIUM 112 MCG
112 TABLET ORAL DAILY
Qty: 90 TABLET | Refills: 0 | Status: SHIPPED | OUTPATIENT
Start: 2024-07-22

## 2024-08-01 RX ORDER — ROSUVASTATIN CALCIUM 5 MG/1
5 TABLET, COATED ORAL DAILY
Qty: 90 TABLET | Refills: 0 | Status: SHIPPED | OUTPATIENT
Start: 2024-08-01

## 2024-10-14 DIAGNOSIS — E06.3 HYPOTHYROIDISM DUE TO HASHIMOTO'S THYROIDITIS: Primary | ICD-10-CM

## 2024-10-14 RX ORDER — LEVOTHYROXINE SODIUM 112 MCG
112 TABLET ORAL DAILY
Qty: 90 TABLET | Refills: 0 | Status: SHIPPED | OUTPATIENT
Start: 2024-10-14

## 2024-10-23 DIAGNOSIS — E78.2 MIXED HYPERLIPIDEMIA: Primary | ICD-10-CM

## 2024-10-23 RX ORDER — ROSUVASTATIN CALCIUM 5 MG/1
5 TABLET, COATED ORAL DAILY
Qty: 90 TABLET | Refills: 0 | Status: SHIPPED | OUTPATIENT
Start: 2024-10-23

## 2024-11-13 ENCOUNTER — TELEPHONE (OUTPATIENT)
Dept: OBSTETRICS AND GYNECOLOGY | Facility: CLINIC | Age: 54
End: 2024-11-13
Payer: COMMERCIAL

## 2024-11-13 NOTE — TELEPHONE ENCOUNTER
-- DO NOT REPLY / DO NOT REPLY ALL --  -- Message is from Engagement Center Operations (EC[facility]) --    ONLY TO BE USED WITHIN A REFILL MEDICATION ENCOUNTER    Med Refill  Is the patient currently having any symptoms?: No/Non-Emergent symptoms    Name of medication requested: Bumetanide 2 MG tablet (The pharmacy is requesting a new prescription for medication and a refill)    Has patient contacted the pharmacy? Yes    Is this the first request for the medication in the last 48 hours?: Yes    Patient is requesting a medication refill - medication is on active medication list    Patient is currently OUT of the requested medication - sent as HIGH priority      Full name of the provider who ordered the medication: Dr. Elfego Stapleton    [facility] site name / Account # for provider: TANNER Shaver    Preferred Pharmacy: Pharmacy  [id number] - [facility] [address] [facility]    Patient confirmed the above pharmacy as correct?  Yes      Caller Information       Type Contact Phone/Fax    04/08/2023 09:18 AM CDT Phone (Incoming) Sabra Ochoa (Self) [phone] (M)          Alternative phone number: [phone]      Can a detailed message be left?: Yes    Patient is completely out of medication: Verify if patient is currently experiencing symptoms. If patient is symptomatic, proceed with front end triage instead of medication refill. If patient is not symptomatic but is completely out of medication, erasmo as High priority when routing. Inform patient: “Please call back with any questions or concerns and if your condition becomes life threatening, you should seek immediate medical assistance by calling 911 or going to the Emergency Department for evaluation.”    Inform all patients: \"If the clinical team needs to contact you regarding this refill, please be aware the return phone call may come from an unidentified or out of state phone number and your refill request will be  Patient contacted and requests an appointment for evaluation. Appointment scheduled.    addressed as soon as the clinical team reviews your message.\"

## 2024-11-13 NOTE — TELEPHONE ENCOUNTER
Caller: Danelle Carpenter     Relationship: SELF    Best call back number: [phone]    What is your medical concern? ON LEFT BREAST CLOSE TO NIPPLE AND GOING OUT TOWARDS THE ARM - PT FELT AN ABNORMALITY - PT NOT SURE IF THIS IS JUST DENSE TISSUE OR SOMETHING THAT NEEDS TO BE ADDRESSED ASAP - SHE HAD HER LAST MAMMOGRAM ON FED 13, 2024 - SHE IS SCHEDULED FOR HER ANNUAL WITH DR. CHESTER ON DECEMBER 18TH (5 WKS AWAY) PAT ASKING IF SHE NEEDS TO BE SEEN SOONER FOR THIS BREAST ISSUE OR IS OK TO WAIT UNTIL ANNUAL APPT???    How long has this issue been going on? YESTERDAY    Is your provider already aware of this issue? NO    Have you been treated for this issue? NO    PLEASE CALL PT TO ADVISE / DISCUSS    PS - PT'S PATERNAL GRANDMOTHER HAD BREAST CANCER

## 2024-11-14 ENCOUNTER — OFFICE VISIT (OUTPATIENT)
Dept: OBSTETRICS AND GYNECOLOGY | Facility: CLINIC | Age: 54
End: 2024-11-14
Payer: COMMERCIAL

## 2024-11-14 VITALS
HEART RATE: 85 BPM | WEIGHT: 156 LBS | SYSTOLIC BLOOD PRESSURE: 111 MMHG | BODY MASS INDEX: 25.07 KG/M2 | HEIGHT: 66 IN | DIASTOLIC BLOOD PRESSURE: 78 MMHG

## 2024-11-14 DIAGNOSIS — N63.20 MASS OF LEFT BREAST, UNSPECIFIED QUADRANT: Primary | ICD-10-CM

## 2024-11-14 PROCEDURE — 99214 OFFICE O/P EST MOD 30 MIN: CPT | Performed by: OBSTETRICS & GYNECOLOGY

## 2024-11-14 NOTE — PROGRESS NOTES
"GYN Visit    Chief Complaint   Patient presents with    Breast Problem       HPI:   54 y.o. status post TLH presents complaining of a lump in the left breast on self breast exam.  Patient has had a previous lumpectomy in this breast.  Patient denies any pain or erythema.  She drinks caffeinated beverages about 4 times per week.  Paternal grandmother with a history of breast cancer      History: PMHx, Meds, Allergies, PSHx, Social Hx, and POBHx all reviewed and updated.    PHYSICAL EXAM:  /78   Pulse 85   Ht 167.6 cm (65.98\")   Wt 70.8 kg (156 lb)   Breastfeeding No   BMI 25.19 kg/m²  Chaperone present during exam.   General- NAD, alert and oriented, appropriate  Psych- Normal mood, good memory        Breast left-  Bilaterally symmetrical, no masses, non tender, no nipple discharge + scarring outer quadrant left breast consistent with previous breast biopsy.  2 cm mobile cystic area just anterior to the previous breast biopsy scar  Breast right- Bilaterally symmetrical, no masses, non tender, no nipple discharge          ASSESSMENT AND PLAN:  Diagnoses and all orders for this visit:    1. Mass of left breast, unspecified quadrant (Primary)  -     Mammo Diagnostic Digital Tomosynthesis Left With CAD; Future  -     US Breast Left Limited; Future            Follow Up:  Return for I placed order for left diagnostic mammogram and ultrasound please schedule.          Marion Dior DO  2024    [facility]  [address] [place] [address]  Dept: [phone]  Dept Fax: [phone]  Loc: [phone]  Loc Fax: [phone]     "

## 2024-12-04 ENCOUNTER — HOSPITAL ENCOUNTER (OUTPATIENT)
Dept: MAMMOGRAPHY | Facility: HOSPITAL | Age: 54
Discharge: HOME | End: 2024-12-04
Payer: COMMERCIAL

## 2024-12-04 ENCOUNTER — HOSPITAL ENCOUNTER (OUTPATIENT)
Dept: ULTRASOUND IMAGING | Facility: HOSPITAL | Age: 54
Discharge: HOME | End: 2024-12-04
Payer: COMMERCIAL

## 2024-12-04 DIAGNOSIS — N63.20 MASS OF LEFT BREAST, UNSPECIFIED QUADRANT: ICD-10-CM

## 2024-12-04 PROCEDURE — 76642 ULTRASOUND BREAST LIMITED: CPT

## 2024-12-04 PROCEDURE — 77065 DX MAMMO INCL CAD UNI: CPT

## 2024-12-04 PROCEDURE — G0279 TOMOSYNTHESIS, MAMMO: HCPCS

## 2024-12-16 NOTE — PROGRESS NOTES
"Well Woman Visit    CC: Scheduled annual well gyn visit  Chief Complaint   Patient presents with    Annual Exam       HPI:   54 y.o.   Social History     Substance and Sexual Activity   Sexual Activity Yes    Birth control/protection: Surgical, Hysterectomy       54-year-old female G1, P1 status post TL here for annual exam.  No bleeding.  Occasional hot flash since discontinuing her estrogen patches.  Patient is currently using an estrogen vaginal cream.  She stopped the patches when she discovered a lump in her breast as she was concerned that it might be associated with breast cancer.  She has had a follow-up diagnostic mammogram and ultrasound which were negative.  No KATHIA  Pt has no complaints today.    PCP: does manage PMHx and preventative labs  History: PMHx, Meds, Allergies, PSHx, Social Hx, and POBHx all reviewed and updated.    PHYSICAL EXAM:  /86   Pulse 79   Ht 167.6 cm (65.98\")   Wt 71.2 kg (157 lb)   Breastfeeding No   BMI 25.36 kg/m²  Not found.  Chaperone present  General- NAD, alert and oriented, appropriate  Psych- Normal mood, good memory  Neck- No masses, no thyroid enlargement  CV- Regular rhythm, no murmurs  Resp- CTA to bases, no wheezes  Abdomen- Soft, non distended, non tender, no masses    Chaperone present during breast and/or pelvic exam if performed.  Breast left-  Bilaterally symmetrical, no masses, non tender, no nipple discharge, no axillary or supraclavicular nodes palpable.    Breast right- Bilaterally symmetrical, no masses, non tender, no nipple discharge, no axillary or supraclavicular nodes palpable.      External genitalia- Normal female, no lesions  Urethra/meatus- Normal, no masses, non tender  Bladder- Normal, no masses, non tender  Vagina- Normal, no atrophy, no lesions, no discharge.      Cvx- Absent  Uterus- Absent  Adnexa- No mass, non tender  Anus/Rectum/Perineum- Normal appearance, no mass, good sphincter tone, no hemorrhoids, no prolapse, Hemoccult " NEGATIVE    Lymphatic- No palpable neck, axillary, or groin nodes  Ext- No edema, no cyanosis    Skin- No lesions, no rashes, no acanthosis nigricans      ASSESSMENT and PLAN:    Diagnoses and all orders for this visit:    1. Well woman exam with routine gynecological exam (Primary)  -     Mammo Screening Digital Tomosynthesis Bilateral With CAD; Future    2. Menopausal symptoms  -     estradiol (MINIVELLE, VIVELLE-DOT) 0.05 MG/24HR patch; Place 1 patch on the skin as directed by provider 2 (Two) Times a Week.  Dispense: 24 patch; Refill: 4        Preventative:  BREAST HEALTH- Monthly self breast exam importance and how to reviewed. MMG and/or MRI (prn) reviewed per society guidelines and her individual history. Screen: Updated today  CERVICAL CANCER Screening- Reviewed current ASCCP guidelines for screening w and wo cotest HPV, age specific.  Screen: Not medically needed  COLON CANCER Screening- Reviewed current medical society guidelines and options.  Screen:  Already up to date  Follow up PCP/Specialist PMHx and/or Labs          She understands the importance of having any ordered tests to be performed in a timely fashion.  The risks of not performing them include, but are not limited to, advanced cancer stages, bone loss from osteoporosis and/or subsequent increase in morbidity and/or mortality.  She is encouraged to review her results online and/or contact or office if she has questions.     Follow Up:  Return in about 1 year (around 12/18/2025) for Annual physical.            Marion Dior, DO  12/18/2024    [facility]  [facility] [place] [address]  Dept: [phone]  Dept Fax: [phone]  Loc: [phone]  Loc Fax: [phone]

## 2024-12-18 ENCOUNTER — OFFICE VISIT (OUTPATIENT)
Dept: OBSTETRICS AND GYNECOLOGY | Facility: CLINIC | Age: 54
End: 2024-12-18
Payer: COMMERCIAL

## 2024-12-18 VITALS
HEIGHT: 66 IN | HEART RATE: 79 BPM | BODY MASS INDEX: 25.23 KG/M2 | DIASTOLIC BLOOD PRESSURE: 86 MMHG | WEIGHT: 157 LBS | SYSTOLIC BLOOD PRESSURE: 121 MMHG

## 2024-12-18 DIAGNOSIS — N95.1 MENOPAUSAL SYMPTOMS: ICD-10-CM

## 2024-12-18 DIAGNOSIS — Z01.419 WELL WOMAN EXAM WITH ROUTINE GYNECOLOGICAL EXAM: Primary | ICD-10-CM

## 2024-12-18 PROCEDURE — 99396 PREV VISIT EST AGE 40-64: CPT | Performed by: OBSTETRICS & GYNECOLOGY

## 2024-12-18 RX ORDER — ESTRADIOL 0.05 MG/D
1 PATCH, EXTENDED RELEASE TRANSDERMAL 2 TIMES WEEKLY
Qty: 24 PATCH | Refills: 4 | Status: SHIPPED | OUTPATIENT
Start: 2024-12-19

## 2025-01-02 DIAGNOSIS — E78.2 MIXED HYPERLIPIDEMIA: ICD-10-CM

## 2025-01-02 RX ORDER — ROSUVASTATIN CALCIUM 5 MG/1
5 TABLET, COATED ORAL DAILY
Qty: 90 TABLET | Refills: 0 | Status: SHIPPED | OUTPATIENT
Start: 2025-01-02

## 2025-01-07 DIAGNOSIS — E06.3 HYPOTHYROIDISM DUE TO HASHIMOTO'S THYROIDITIS: ICD-10-CM

## 2025-01-07 RX ORDER — LEVOTHYROXINE SODIUM 112 MCG
112 TABLET ORAL DAILY
Qty: 90 TABLET | Refills: 0 | Status: SHIPPED | OUTPATIENT
Start: 2025-01-07

## 2025-01-07 RX ORDER — FLUOXETINE 10 MG/1
10 CAPSULE ORAL DAILY
Qty: 90 CAPSULE | Refills: 3 | Status: SHIPPED | OUTPATIENT
Start: 2025-01-07

## 2025-02-14 ENCOUNTER — HOSPITAL ENCOUNTER (OUTPATIENT)
Dept: MAMMOGRAPHY | Facility: HOSPITAL | Age: 55
Discharge: HOME | End: 2025-02-14
Admitting: OBSTETRICS & GYNECOLOGY
Payer: COMMERCIAL

## 2025-02-14 DIAGNOSIS — Z01.419 WELL WOMAN EXAM WITH ROUTINE GYNECOLOGICAL EXAM: ICD-10-CM

## 2025-02-14 PROCEDURE — 77063 BREAST TOMOSYNTHESIS BI: CPT

## 2025-02-14 PROCEDURE — 77067 SCR MAMMO BI INCL CAD: CPT

## 2025-04-02 DIAGNOSIS — E06.3 HYPOTHYROIDISM DUE TO HASHIMOTO'S THYROIDITIS: ICD-10-CM

## 2025-04-02 RX ORDER — LEVOTHYROXINE SODIUM 112 MCG
112 TABLET ORAL DAILY
Qty: 30 TABLET | Refills: 0 | Status: SHIPPED | OUTPATIENT
Start: 2025-04-02

## 2025-04-07 DIAGNOSIS — E78.2 MIXED HYPERLIPIDEMIA: ICD-10-CM

## 2025-04-07 RX ORDER — ROSUVASTATIN CALCIUM 5 MG/1
5 TABLET, COATED ORAL DAILY
Qty: 90 TABLET | Refills: 0 | Status: SHIPPED | OUTPATIENT
Start: 2025-04-07

## 2025-05-16 DIAGNOSIS — E06.3 HYPOTHYROIDISM DUE TO HASHIMOTO'S THYROIDITIS: ICD-10-CM

## 2025-05-16 RX ORDER — LEVOTHYROXINE SODIUM 112 MCG
112 TABLET ORAL DAILY
Qty: 90 TABLET | Refills: 0 | Status: SHIPPED | OUTPATIENT
Start: 2025-05-16

## 2025-06-11 RX ORDER — PROPRANOLOL HYDROCHLORIDE 10 MG/1
10 TABLET ORAL
Qty: 30 TABLET | Refills: 0 | Status: SHIPPED | OUTPATIENT
Start: 2025-06-11

## 2025-06-16 ENCOUNTER — TELEPHONE (OUTPATIENT)
Dept: INTERNAL MEDICINE | Age: 55
End: 2025-06-16
Payer: COMMERCIAL

## 2025-06-16 DIAGNOSIS — R53.83 FATIGUE, UNSPECIFIED TYPE: Primary | ICD-10-CM

## 2025-06-16 DIAGNOSIS — E07.9 THYROID DISEASE: ICD-10-CM

## 2025-06-16 DIAGNOSIS — Z78.0 POSTMENOPAUSAL: ICD-10-CM

## 2025-07-07 ENCOUNTER — LAB (OUTPATIENT)
Dept: LAB | Facility: HOSPITAL | Age: 55
End: 2025-07-07
Payer: COMMERCIAL

## 2025-07-07 DIAGNOSIS — E78.2 MIXED HYPERLIPIDEMIA: ICD-10-CM

## 2025-07-07 DIAGNOSIS — E06.3 HYPOTHYROIDISM DUE TO HASHIMOTO'S THYROIDITIS: ICD-10-CM

## 2025-07-07 DIAGNOSIS — E07.9 THYROID DISEASE: ICD-10-CM

## 2025-07-07 DIAGNOSIS — I34.1 MITRAL VALVE PROLAPSE: ICD-10-CM

## 2025-07-07 DIAGNOSIS — R53.83 FATIGUE, UNSPECIFIED TYPE: ICD-10-CM

## 2025-07-07 DIAGNOSIS — Z00.00 ANNUAL PHYSICAL EXAM: ICD-10-CM

## 2025-07-07 DIAGNOSIS — Z78.0 POSTMENOPAUSAL: ICD-10-CM

## 2025-07-07 DIAGNOSIS — K21.9 GERD WITHOUT ESOPHAGITIS: ICD-10-CM

## 2025-07-07 LAB
ALBUMIN SERPL-MCNC: 4.5 G/DL (ref 3.5–5.2)
ALBUMIN/GLOB SERPL: 1.6 G/DL
ALP SERPL-CCNC: 71 U/L (ref 39–117)
ALT SERPL W P-5'-P-CCNC: 18 U/L (ref 1–33)
ANION GAP SERPL CALCULATED.3IONS-SCNC: 10 MMOL/L (ref 5–15)
AST SERPL-CCNC: 19 U/L (ref 1–32)
BASOPHILS # BLD AUTO: 0.04 10*3/MM3 (ref 0–0.2)
BASOPHILS NFR BLD AUTO: 0.7 % (ref 0–1.5)
BILIRUB SERPL-MCNC: 0.6 MG/DL (ref 0–1.2)
BUN SERPL-MCNC: 13 MG/DL (ref 6–20)
BUN/CREAT SERPL: 19.1 (ref 7–25)
CALCIUM SPEC-SCNC: 9.4 MG/DL (ref 8.6–10.5)
CHLORIDE SERPL-SCNC: 104 MMOL/L (ref 98–107)
CHOLEST SERPL-MCNC: 189 MG/DL (ref 0–200)
CO2 SERPL-SCNC: 25 MMOL/L (ref 22–29)
CORTIS AM PEAK SERPL-MCNC: 10.27 MCG/DL (ref 6.02–18.4)
CREAT SERPL-MCNC: 0.68 MG/DL (ref 0.57–1)
DEPRECATED RDW RBC AUTO: 40.8 FL (ref 37–54)
EGFRCR SERPLBLD CKD-EPI 2021: 101.7 ML/MIN/1.73
EOSINOPHIL # BLD AUTO: 0.22 10*3/MM3 (ref 0–0.4)
EOSINOPHIL NFR BLD AUTO: 3.7 % (ref 0.3–6.2)
ERYTHROCYTE [DISTWIDTH] IN BLOOD BY AUTOMATED COUNT: 12.4 % (ref 12.3–15.4)
FSH SERPL-ACNC: 58.3 MIU/ML
GLOBULIN UR ELPH-MCNC: 2.8 GM/DL
GLUCOSE SERPL-MCNC: 96 MG/DL (ref 65–99)
HCT VFR BLD AUTO: 42.4 % (ref 34–46.6)
HDLC SERPL-MCNC: 66 MG/DL (ref 40–60)
HGB BLD-MCNC: 14.5 G/DL (ref 12–15.9)
IMM GRANULOCYTES # BLD AUTO: 0.03 10*3/MM3 (ref 0–0.05)
IMM GRANULOCYTES NFR BLD AUTO: 0.5 % (ref 0–0.5)
LDLC SERPL CALC-MCNC: 95 MG/DL (ref 0–100)
LDLC/HDLC SERPL: 1.37 {RATIO}
LH SERPL-ACNC: 37.2 MIU/ML
LYMPHOCYTES # BLD AUTO: 2.18 10*3/MM3 (ref 0.7–3.1)
LYMPHOCYTES NFR BLD AUTO: 36.3 % (ref 19.6–45.3)
MCH RBC QN AUTO: 30.7 PG (ref 26.6–33)
MCHC RBC AUTO-ENTMCNC: 34.2 G/DL (ref 31.5–35.7)
MCV RBC AUTO: 89.8 FL (ref 79–97)
MONOCYTES # BLD AUTO: 0.41 10*3/MM3 (ref 0.1–0.9)
MONOCYTES NFR BLD AUTO: 6.8 % (ref 5–12)
NEUTROPHILS NFR BLD AUTO: 3.13 10*3/MM3 (ref 1.7–7)
NEUTROPHILS NFR BLD AUTO: 52 % (ref 42.7–76)
NRBC BLD AUTO-RTO: 0 /100 WBC (ref 0–0.2)
PLATELET # BLD AUTO: 244 10*3/MM3 (ref 140–450)
PMV BLD AUTO: 9.5 FL (ref 6–12)
POTASSIUM SERPL-SCNC: 4.7 MMOL/L (ref 3.5–5.2)
PROT SERPL-MCNC: 7.3 G/DL (ref 6–8.5)
RBC # BLD AUTO: 4.72 10*6/MM3 (ref 3.77–5.28)
SODIUM SERPL-SCNC: 139 MMOL/L (ref 136–145)
T4 FREE SERPL-MCNC: 1.53 NG/DL (ref 0.92–1.68)
TRIGL SERPL-MCNC: 163 MG/DL (ref 0–150)
TSH SERPL DL<=0.05 MIU/L-ACNC: 0.54 UIU/ML (ref 0.27–4.2)
VLDLC SERPL-MCNC: 28 MG/DL (ref 5–40)
WBC NRBC COR # BLD AUTO: 6.01 10*3/MM3 (ref 3.4–10.8)

## 2025-07-07 PROCEDURE — 36415 COLL VENOUS BLD VENIPUNCTURE: CPT

## 2025-07-07 PROCEDURE — 80050 GENERAL HEALTH PANEL: CPT

## 2025-07-07 PROCEDURE — 83001 ASSAY OF GONADOTROPIN (FSH): CPT

## 2025-07-07 PROCEDURE — 80061 LIPID PANEL: CPT

## 2025-07-07 PROCEDURE — 82533 TOTAL CORTISOL: CPT

## 2025-07-07 PROCEDURE — 82672 ASSAY OF ESTROGEN: CPT

## 2025-07-07 PROCEDURE — 83002 ASSAY OF GONADOTROPIN (LH): CPT

## 2025-07-07 PROCEDURE — 84439 ASSAY OF FREE THYROXINE: CPT

## 2025-07-09 LAB — ESTROGEN SERPL-MCNC: 82 PG/ML (ref 40–244)

## 2025-07-10 ENCOUNTER — OFFICE VISIT (OUTPATIENT)
Dept: INTERNAL MEDICINE | Age: 55
End: 2025-07-10
Payer: COMMERCIAL

## 2025-07-10 VITALS
DIASTOLIC BLOOD PRESSURE: 83 MMHG | TEMPERATURE: 98.2 F | HEIGHT: 66 IN | HEART RATE: 85 BPM | BODY MASS INDEX: 24.43 KG/M2 | OXYGEN SATURATION: 96 % | WEIGHT: 152 LBS | SYSTOLIC BLOOD PRESSURE: 119 MMHG

## 2025-07-10 DIAGNOSIS — Z00.00 ANNUAL PHYSICAL EXAM: Primary | ICD-10-CM

## 2025-07-10 DIAGNOSIS — I34.1 MITRAL VALVE PROLAPSE: ICD-10-CM

## 2025-07-10 DIAGNOSIS — E78.2 MIXED HYPERLIPIDEMIA: ICD-10-CM

## 2025-07-10 DIAGNOSIS — N95.1 MENOPAUSAL SYMPTOMS: ICD-10-CM

## 2025-07-10 DIAGNOSIS — E06.3 HYPOTHYROIDISM DUE TO HASHIMOTO'S THYROIDITIS: ICD-10-CM

## 2025-07-10 PROCEDURE — 99396 PREV VISIT EST AGE 40-64: CPT | Performed by: INTERNAL MEDICINE

## 2025-07-10 RX ORDER — ESTRADIOL 0.05 MG/D
1 PATCH, EXTENDED RELEASE TRANSDERMAL 2 TIMES WEEKLY
Qty: 24 PATCH | Refills: 4 | Status: SHIPPED | OUTPATIENT
Start: 2025-07-10

## 2025-07-10 RX ORDER — LEVOTHYROXINE SODIUM 112 UG/1
112 TABLET ORAL DAILY
Qty: 90 TABLET | Refills: 0 | Status: SHIPPED | OUTPATIENT
Start: 2025-07-10

## 2025-07-10 RX ORDER — FLUOXETINE 10 MG/1
10 CAPSULE ORAL DAILY
Qty: 90 CAPSULE | Refills: 3 | Status: SHIPPED | OUTPATIENT
Start: 2025-07-10

## 2025-07-10 RX ORDER — PROPRANOLOL HYDROCHLORIDE 10 MG/1
10 TABLET ORAL
Qty: 90 TABLET | Refills: 3 | Status: SHIPPED | OUTPATIENT
Start: 2025-07-10

## 2025-07-10 RX ORDER — ROSUVASTATIN CALCIUM 5 MG/1
5 TABLET, COATED ORAL DAILY
Qty: 90 TABLET | Refills: 3 | Status: SHIPPED | OUTPATIENT
Start: 2025-07-10

## 2025-07-10 NOTE — PROGRESS NOTES
"Chief Complaint   Patient presents with    Annual Exam     Physical, lab follow up. Med refill. Pt states bilateral foot discomfort.         Objective   Vital Signs  Vitals:    07/10/25 0847   BP: 119/83   BP Location: Left arm   Patient Position: Sitting   Pulse: 85   Temp: 98.2 °F (36.8 °C)   SpO2: 96%   Weight: 68.9 kg (152 lb)   Height: 167.6 cm (65.98\")      Body mass index is 24.55 kg/m².  Review of Systems   Constitutional: Negative.    HENT: Negative.     Eyes: Negative.    Respiratory: Negative.     Cardiovascular: Negative.    Gastrointestinal: Negative.    Endocrine: Negative.    Genitourinary: Negative.    Musculoskeletal: Negative.    Allergic/Immunologic: Negative.    Neurological: Negative.    Hematological: Negative.    Psychiatric/Behavioral: Negative.        Physical Exam  Constitutional:       General: She is not in acute distress.     Appearance: Normal appearance.   HENT:      Head: Normocephalic.      Mouth/Throat:      Mouth: Mucous membranes are moist.   Eyes:      Conjunctiva/sclera: Conjunctivae normal.      Pupils: Pupils are equal, round, and reactive to light.   Cardiovascular:      Rate and Rhythm: Normal rate and regular rhythm.      Pulses: Normal pulses.      Heart sounds: Normal heart sounds.   Pulmonary:      Effort: Pulmonary effort is normal.      Breath sounds: Normal breath sounds.   Abdominal:      General: Abdomen is flat. Bowel sounds are normal.      Palpations: Abdomen is soft.   Musculoskeletal:         General: No swelling. Normal range of motion.      Cervical back: Neck supple.   Skin:     General: Skin is warm and dry.      Coloration: Skin is not jaundiced.   Neurological:      General: No focal deficit present.      Mental Status: She is alert and oriented to person, place, and time. Mental status is at baseline.   Psychiatric:         Mood and Affect: Mood normal.         Behavior: Behavior normal.         Thought Content: Thought content normal.         Judgment: " "Judgment normal.     Patient has a slightly raised pinkish-red lesion on the anterior ear area on the actual cheek mandible region, that slightly oval no ulceration, she has a little bit of rosacea on the cheeks right greater than left  Result Review :   No results found for: \"PROBNP\", \"BNP\"  CMP          7/7/2025    09:00   CMP   Glucose 96    BUN 13.0    Creatinine 0.68    EGFR 101.7    Sodium 139    Potassium 4.7    Chloride 104    Calcium 9.4    Total Protein 7.3    Albumin 4.5    Globulin 2.8    Total Bilirubin 0.6    Alkaline Phosphatase 71    AST (SGOT) 19    ALT (SGPT) 18    Albumin/Globulin Ratio 1.6    BUN/Creatinine Ratio 19.1    Anion Gap 10.0      CBC w/diff          7/7/2025    09:00   CBC w/Diff   WBC 6.01    RBC 4.72    Hemoglobin 14.5    Hematocrit 42.4    MCV 89.8    MCH 30.7    MCHC 34.2    RDW 12.4    Platelets 244    Neutrophil Rel % 52.0    Immature Granulocyte Rel % 0.5    Lymphocyte Rel % 36.3    Monocyte Rel % 6.8    Eosinophil Rel % 3.7    Basophil Rel % 0.7       Lipid Panel          7/7/2025    09:00   Lipid Panel   Total Cholesterol 189    Triglycerides 163    HDL Cholesterol 66    VLDL Cholesterol 28    LDL Cholesterol  95    LDL/HDL Ratio 1.37       Lab Results   Component Value Date    TSH 0.542 07/07/2025    TSH 0.590 06/27/2024    TSH 0.703 12/20/2023      Lab Results   Component Value Date    FREET4 1.53 07/07/2025    FREET4 1.55 06/27/2024    FREET4 1.48 12/20/2023                          Visit Diagnoses:    ICD-10-CM ICD-9-CM   1. Annual physical exam  Z00.00 V70.0   2. Menopausal symptoms  N95.1 627.2   3. Hypothyroidism due to Hashimoto's thyroiditis  E06.3 245.2   4. Mixed hyperlipidemia  E78.2 272.2   5. Mitral valve prolapse  I34.1 424.0       Assessment and Plan   Diagnoses and all orders for this visit:    1. Annual physical exam (Primary)  -     CBC & Differential; Future  -     Comprehensive Metabolic Panel; Future  -     Lipid Panel; Future  -     TSH+Free T4; " Future    2. Menopausal symptoms  -     estradiol (MINIVELLE, VIVELLE-DOT) 0.05 MG/24HR patch; Place 1 patch on the skin as directed by provider 2 (Two) Times a Week.  Dispense: 24 patch; Refill: 4  -     CBC & Differential; Future  -     Comprehensive Metabolic Panel; Future  -     Lipid Panel; Future  -     TSH+Free T4; Future    3. Hypothyroidism due to Hashimoto's thyroiditis  -     levothyroxine (Synthroid) 112 MCG tablet; Take 1 tablet by mouth Daily.  Dispense: 90 tablet; Refill: 0  -     CBC & Differential; Future  -     Comprehensive Metabolic Panel; Future  -     Lipid Panel; Future  -     TSH+Free T4; Future    4. Mixed hyperlipidemia  -     rosuvastatin (CRESTOR) 5 MG tablet; Take 1 tablet by mouth Daily.  Dispense: 90 tablet; Refill: 3  -     CBC & Differential; Future  -     Comprehensive Metabolic Panel; Future  -     Lipid Panel; Future  -     TSH+Free T4; Future    5. Mitral valve prolapse  -     CBC & Differential; Future  -     Comprehensive Metabolic Panel; Future  -     Lipid Panel; Future  -     TSH+Free T4; Future    Other orders  -     FLUoxetine (PROzac) 10 MG capsule; Take 1 capsule by mouth Daily.  Dispense: 90 capsule; Refill: 3  -     propranolol (INDERAL) 10 MG tablet; Take 1 tablet by mouth Every Other Day.  Dispense: 90 tablet; Refill: 3      Annual exam, preventive measures discussed as above she wears her seatbelt does not smoke does not drink heavily, she is active works on a regular basis, discussed July 10, 2025    Menopause, FSH LH are both moderately elevated July 2025, patient continues on Vivelle dot patch, twice a week discussed briefly risk benefits cardiac breast cancer etc.     Right mid quadrant abdominal pain etiology is unclear, discussed differential workup etc., April 4, 2024,, resolved, CT abdomen and pelvis April 18, 2024 shows no acute intra-abdominal or pelvic abnormalities, stone in the bladder just past the right UVJ possibly recently passed stone,      Wrist  fracture September 2023 after a fall, doing well,  Bone density test showed normal results June 5, 2023     Colonoscopy for colon polyps February 2018 ---3 polyps mild gastritis Dr. Ring -------repeat March 2023,,, Dr. Vargas, found several polyps, did upper and lower, has a follow-up again in 3 years, Dr. Vargas    History of odontophagia--- fullness esophagus, previous esophagram showed mid esophageal fullness, CT of the chest July 2019 showed no significant thoracic esophageal abnormality,  no lung abnormality,    Hypothyroidism continues  levothyroxine 0.112 mg daily    Mixed hyperlipidemia continues  Crestor 5 mg daily     Mitral valve prolapse, palpitations, continues  propranolol 10 mg every other day --- echo 2018 showed mild mitral valve prolapse,, repeat echo June 5, 2023 --shows normal LV function, diastolic dysfunction was normal no significant valve abnormalities    Thyroid nodules previous--- ultrasound July 2015 no nodules at that time    Anxiety continues fluoxetine 10 mg every 3 days,     BRYANT October 2020,        Follow Up   Return in about 1 year (around 7/10/2026).  Patient was given instructions and counseling regarding her condition or for health maintenance advice. Please see specific information pulled into the AVS if appropriate.

## 2025-07-14 DIAGNOSIS — E06.3 HYPOTHYROIDISM DUE TO HASHIMOTO'S THYROIDITIS: ICD-10-CM

## 2025-07-14 RX ORDER — LEVOTHYROXINE SODIUM 112 MCG
112 TABLET ORAL DAILY
Qty: 90 TABLET | Refills: 0 | OUTPATIENT
Start: 2025-07-14

## 2025-07-14 NOTE — TELEPHONE ENCOUNTER
Spoke w/ pt, needs refills sent to [facility], not mail service pharmacy at this time.

## 2025-07-15 RX ORDER — LEVOTHYROXINE SODIUM 112 UG/1
112 TABLET ORAL DAILY
Qty: 90 TABLET | Refills: 3 | Status: SHIPPED | OUTPATIENT
Start: 2025-07-15

## 2025-07-29 NOTE — PROGRESS NOTES
Answers submitted by the patient for this visit:  Problem not listed (Submitted on 7/30/2025)  Chief Complaint: Other medical problem  Reason for appointment: Fever, chills, cough and runny nose  abdominal pain: No  anorexia: Yes  joint pain: No  change in stool: No  chest pain: No  chills: Yes  nasal congestion: Yes  cough: Yes  fatigue: Yes  fever: Yes  headaches: Yes  joint swelling: No  myalgias: No  nausea: No  neck pain: No  numbness: No  rash: No  sore throat: No  swollen glands: No  dysuria: No  vertigo: No  visual change: No  vomiting: No  weakness: Yes  Onset: in the past 7 days  Chronicity: new  Medications tried: Tylenol      Chief Complaint  Cough, Fever, and Chills (55 year old female here today for a fever, chills and a cough. Pt states she started feeling bad on Monday and was unable to get in with Dr. Valiente. )    Subjective      History of Present Illness  The patient presents for evaluation of COVID-19 infection and right ear infection.    She began experiencing symptoms on Monday, initially feeling unusual in her throat. By Tuesday morning, she developed chills, and by lunchtime, she had a fever, cough, runny nose, and headache. Today, she reports ear pain. She felt slightly nauseous yesterday and has been experiencing palpitations. She has not taken any over-the-counter medications due to her mitral valve prolapse and beta blocker use. Typically, she takes allergy medication but did not take any yesterday, opting to use Flonase instead. She has not used a neti pot before. She recalls having an ear infection as a child. She believes she may have contracted COVID-19 two years ago, although she was never tested.    She has a history of mitral valve prolapse and is currently on a beta blocker. She occasionally experiences vaginal yeast infections when taking antibiotics and has Diflucan available for treatment.    She received her measles vaccine as a baby in 1969 and is considering whether she  should get another one.    Social History:  Living Condition: She works from home. Her  was sick over the weekend and brought the illness home.       Past Medical History:   Diagnosis Date    Depression     Disease of thyroid gland     High cholesterol     Mitral valve disorder     Thyroid disease         Past Surgical History:   Procedure Laterality Date    COLONOSCOPY      COLONOSCOPY N/A 3/31/2023    Procedure: COLONOSCOPY with polypectomy with cold snares,;  Surgeon: Evelio Vargas MD;  Location: [facility] ENDOSCOPY;  Service: General;  Laterality: N/A;  colon polyps, hemorrhoids     ENDOSCOPY      ENDOSCOPY N/A 3/31/2023    Procedure: ESOPHAGOGASTRODUODENOSCOPY with biopsy, forcep polypectomy;  Surgeon: Evelio Vargas MD;  Location: [facility] ENDOSCOPY;  Service: General;  Laterality: N/A;  gastric polyps, hiatal hernia    VAGINAL HYSTERECTOMY          Social History     Tobacco Use   Smoking Status Never    Passive exposure: Never   Smokeless Tobacco Never        Patient Care Team:  Reilly Valiente MD as PCP - General (Internal Medicine)  Jasmyn Moore MD as Consulting Physician (Urology)    No Known Allergies       Current Outpatient Medications:     coenzyme Q10 100 MG capsule, Take 1 capsule by mouth Daily., Disp: , Rfl:     estradiol (MINIVELLE, VIVELLE-DOT) 0.05 MG/24HR patch, Place 1 patch on the skin as directed by provider 2 (Two) Times a Week., Disp: 24 patch, Rfl: 4    FLUoxetine (PROzac) 10 MG capsule, Take 1 capsule by mouth Daily., Disp: 90 capsule, Rfl: 3    levothyroxine (Synthroid) 112 MCG tablet, Take 1 tablet by mouth Daily., Disp: 90 tablet, Rfl: 3    multivitamin with minerals tablet tablet, Take 1 tablet by mouth Daily., Disp: , Rfl:     Omega-3 Fatty Acids (Fish Oil) 1200 MG capsule delayed-release capsule, Take 1 capsule by mouth Daily., Disp: , Rfl:     Probiotic Product (PROBIOTIC BLEND PO), Take 1 tablet by mouth Daily., Disp: , Rfl:     propranolol (INDERAL) 10  "MG tablet, Take 1 tablet by mouth Every Other Day., Disp: 90 tablet, Rfl: 3    rosuvastatin (CRESTOR) 5 MG tablet, Take 1 tablet by mouth Daily., Disp: 90 tablet, Rfl: 3    Vitamin D-Vitamin K (K2 PLUS D3 PO), Take 1 tablet by mouth Daily., Disp: , Rfl:     cefdinir (OMNICEF) 300 MG capsule, Take 1 capsule by mouth 2 (Two) Times a Day for 7 days., Disp: 14 capsule, Rfl: 0    clindamycin (CLEOCIN T) 1 % swab, Apply 1 application  topically to the appropriate area as directed Every 12 (Twelve) Hours. (Patient not taking: Reported on 7/30/2025), Disp: 60 Swab, Rfl: 3    ondansetron (Zofran) 4 MG tablet, Take 1 tablet by mouth Every 8 (Eight) Hours As Needed for Nausea or Vomiting., Disp: 12 tablet, Rfl: 0    Objective     Vitals:    07/30/25 0942   BP: 130/84   BP Location: Left arm   Patient Position: Sitting   Cuff Size: Large Adult   Pulse: 98   Resp: 18   Temp: 98.9 °F (37.2 °C)   TempSrc: Temporal   SpO2: 98%   Weight: 69.9 kg (154 lb 3.2 oz)   Height: 167.6 cm (65.98\")        Wt Readings from Last 3 Encounters:   07/30/25 69.9 kg (154 lb 3.2 oz)   07/10/25 68.9 kg (152 lb)   12/18/24 71.2 kg (157 lb)        BP Readings from Last 3 Encounters:   07/30/25 130/84   07/10/25 119/83   12/18/24 121/86        Physical Exam  Ears: Right ear TM appears red and infected. Left ear has a small amount of wax.  Respiratory: Clear to auscultation, no wheezing, rales or rhonchi  Cardiovascular: Regular rate and rhythm, slightly tachycardic      Result Review   The following data was reviewed by: DON Tracy on 07/30/2025:  [x]  Tests & Results  []  Hospitalization/Emergency Department/Urgent Care  []  Internal/External Consultant Notes       Assessment and Plan     Diagnoses and all orders for this visit:    1. COVID-19 virus infection (Primary)    2. Right otitis media, unspecified otitis media type    3. Acute cough  -     POCT SARS-CoV-2 Antigen IMELDA + Flu    Other orders  -     cefdinir (OMNICEF) 300 MG capsule; " Take 1 capsule by mouth 2 (Two) Times a Day for 7 days.  Dispense: 14 capsule; Refill: 0  -     ondansetron (Zofran) 4 MG tablet; Take 1 tablet by mouth Every 8 (Eight) Hours As Needed for Nausea or Vomiting.  Dispense: 12 tablet; Refill: 0         Assessment & Plan  COVID-19 infection.  - Blood pressure and heart rate are within normal limits, but she presents with a low-grade fever and a positive COVID-19 test.  - Reports feeling better today compared to yesterday.  - Advised to avoid decongestants due to history of mitral valve prolapse and palpitations. Recommended use of Flonase and an antihistamine like Zyrtec. Suggested saline rinse to alleviate pressure, followed by Flonase.  - Informed that she is contagious for the first 5 days from the onset of symptoms and should stay home during this period. For the subsequent 5 days, she can be around people while wearing a mask.    Right ear infection.  - Right ear appears red and infected, likely due to a bacterial infection exacerbated by current COVID-19 infection.  - Cefdinir 300 mg twice daily prescribed for the ear infection, to be taken with food.  - Recommended probiotic to prevent potential diarrhea from the antibiotic.  - Prescription for Zofran 4 mg every 8 hours as needed for nausea and vomiting provided.    Mitral valve prolapse.  - History of mitral valve prolapse and currently on a beta blocker.  - Advised to avoid over-the-counter decongestants due to potential interactions with her condition.    Health maintenance.  - Inquired about the need for a measles vaccine booster, given last vaccination was in 1969.  - Discussed that immunity can wane over time. Advised to consider getting a blood test to check immunity levels or to get revaccinated.  - Informed that the MMR vaccine is not available at this clinic and should inquire at her pharmacy.     BMI is within normal parameters. No other follow-up for BMI required.     Patient was given instructions and  counseling regarding her condition or for health maintenance advice. Please see specific information pulled into the AVS if appropriate.     Follow Up   Return for Next scheduled follow up, or if symptoms worsen or fail to improve.    Patient or patient representative verbalized consent for the use of Ambient Listening during the visit with  DON Tracy for chart documentation. 7/30/2025  09:53 EDT    DON Tracy

## 2025-07-30 ENCOUNTER — OFFICE VISIT (OUTPATIENT)
Dept: INTERNAL MEDICINE | Age: 55
End: 2025-07-30
Payer: COMMERCIAL

## 2025-07-30 VITALS
TEMPERATURE: 98.9 F | HEART RATE: 98 BPM | SYSTOLIC BLOOD PRESSURE: 130 MMHG | RESPIRATION RATE: 18 BRPM | HEIGHT: 66 IN | BODY MASS INDEX: 24.78 KG/M2 | DIASTOLIC BLOOD PRESSURE: 84 MMHG | OXYGEN SATURATION: 98 % | WEIGHT: 154.2 LBS

## 2025-07-30 DIAGNOSIS — R05.1 ACUTE COUGH: ICD-10-CM

## 2025-07-30 DIAGNOSIS — H66.91 RIGHT OTITIS MEDIA, UNSPECIFIED OTITIS MEDIA TYPE: ICD-10-CM

## 2025-07-30 DIAGNOSIS — U07.1 COVID-19 VIRUS INFECTION: Primary | ICD-10-CM

## 2025-07-30 LAB
EXPIRATION DATE: ABNORMAL
FLUAV AG UPPER RESP QL IA.RAPID: NOT DETECTED
FLUBV AG UPPER RESP QL IA.RAPID: NOT DETECTED
INTERNAL CONTROL: ABNORMAL
Lab: ABNORMAL
SARS-COV-2 AG UPPER RESP QL IA.RAPID: DETECTED

## 2025-07-30 PROCEDURE — 87428 SARSCOV & INF VIR A&B AG IA: CPT | Performed by: NURSE PRACTITIONER

## 2025-07-30 PROCEDURE — 99213 OFFICE O/P EST LOW 20 MIN: CPT | Performed by: NURSE PRACTITIONER

## 2025-07-30 RX ORDER — ONDANSETRON 4 MG/1
4 TABLET, FILM COATED ORAL EVERY 8 HOURS PRN
Qty: 12 TABLET | Refills: 0 | Status: SHIPPED | OUTPATIENT
Start: 2025-07-30

## 2025-07-30 RX ORDER — CEFDINIR 300 MG/1
300 CAPSULE ORAL 2 TIMES DAILY
Qty: 14 CAPSULE | Refills: 0 | Status: SHIPPED | OUTPATIENT
Start: 2025-07-30 | End: 2025-08-06